# Patient Record
Sex: FEMALE | Race: BLACK OR AFRICAN AMERICAN | NOT HISPANIC OR LATINO | Employment: UNEMPLOYED | URBAN - METROPOLITAN AREA
[De-identification: names, ages, dates, MRNs, and addresses within clinical notes are randomized per-mention and may not be internally consistent; named-entity substitution may affect disease eponyms.]

---

## 2017-02-03 ENCOUNTER — OFFICE VISIT (OUTPATIENT)
Dept: FAMILY MEDICINE | Facility: CLINIC | Age: 37
End: 2017-02-03

## 2017-02-03 VITALS
HEART RATE: 85 BPM | RESPIRATION RATE: 14 BRPM | DIASTOLIC BLOOD PRESSURE: 79 MMHG | WEIGHT: 189.8 LBS | TEMPERATURE: 98 F | OXYGEN SATURATION: 99 % | SYSTOLIC BLOOD PRESSURE: 121 MMHG | BODY MASS INDEX: 29.27 KG/M2

## 2017-02-03 DIAGNOSIS — K59.00 CONSTIPATION, UNSPECIFIED CONSTIPATION TYPE: Primary | ICD-10-CM

## 2017-02-03 NOTE — PROGRESS NOTES
Preceptor Attestation:   Patient seen and discussed with the resident. Assessment and plan reviewed with resident and agreed upon.   Supervising Physician:  Emmanuel Murry MD  [facility]

## 2017-02-03 NOTE — MR AVS SNAPSHOT
After Visit Summary   2/3/2017    Terrance Mcwilliams    MRN: [id number]           Patient Information     Date Of Birth          1980        Visit Information        Provider Department      2/3/2017 9:00 AM Sean Eduardo, DO [facility]        Today's Diagnoses     Constipation, unspecified constipation type    -  1       Care Instructions    Here is the plan from today's visit    1. Constipation, unspecified constipation type  Drink at least 2 liters of water a day  Take psyllium every day  Come see me in 1 month  - Psyllium 48.57 % POWD; Take 1 tsp. by mouth daily  Dispense: 570 g; Refill: 1    Please call or return to clinic if your symptoms don't go away.    Follow up plan  Please make a clinic appointment for follow up with me (SEAN EDUARDO) in 1  month for constipation.    Thank you for coming to [facility] today.  Lab Testing:  **If you had lab testing today and your results are reassuring or normal they will be mailed to you or sent through [employer] within 7 days.   **If the lab tests need quick action we will call you with the results.  The phone number we will call with results is # [phone] (home) . If this is not the best number please call our clinic and change the number.  Medication Refills:  If you need any refills please call your pharmacy and they will contact us.   If you need to  your refill at a new pharmacy, please contact the new pharmacy directly. The new pharmacy will help you get your medications transferred faster.   Scheduling:  If you have any concerns about today's visit or wish to schedule another appointment please call our office during normal business hours [phone] (8-5:00 M-F)  If a referral was made to a [facility] and you don't get a call from central scheduling please call [phone].  If a Mammogram was ordered for you at [facility] call [phone] to schedule or change your  appointment.  If you had an XRay/CT/Ultrasound/MRI ordered the number is [phone] to schedule or change your radiology appointment.   Medical Concerns:  If you have urgent medical concerns please call [phone] at any time of the day.            Follow-ups after your visit        Who to contact     Please call your clinic at [phone] to:    Ask questions about your health    Make or cancel appointments    Discuss your medicines    Learn about your test results    Speak to your doctor   If you have compliments or concerns about an experience at your clinic, or if you wish to file a complaint, please contact [facility] Patient Relations at [phone] or email us at [email or web address]         Additional Information About Your Visit        [employer] Information     "[employer] is an electronic gateway that provides easy, online access to your medical records. With "[employer] you can request a clinic appointment, read your test results, renew a prescription or communicate with your care team.     To sign up for "[employer] visit the website at [email or web address]   You will be asked to enter the access code listed below, as well as some personal information. Please follow the directions to create your username and password.     Your access code is: [email or web address]  Expires: 2017  9:36 AM     Your access code will  in 90 days. If you need help or a new code, please contact your [facility] or call [phone] for assistance.        Care EveryWhere ID     This is your Care EveryWhere ID. This could be used by other organizations to access your [facility] medical records  [id number]        Your Vitals Were     Pulse Temperature Respirations Pulse Oximetry Last Period       85 98  F (36.7  C) (Oral) 14 99% 2017        Blood Pressure from Last 3 Encounters:   17 121/79   16 117/80   16 119/71    Weight from Last 3  Encounters:   02/03/17 189 lb 12.8 oz (86.093 kg)   06/21/16 186 lb 9.6 oz (84.641 kg)   05/04/16 189 lb (85.73 kg)              Today, you had the following     No orders found for display         Today's Medication Changes          These changes are accurate as of: 2/3/17  9:36 AM.  If you have any questions, ask your nurse or doctor.               Start taking these medicines.        Dose/Directions    hydrocortisone 2.5 % cream   Commonly known as:  ANUSOL-HC   Used for:  Constipation, unspecified constipation type   Started by:  Sean Eduardo, DO        Place rectally 2 times daily   Quantity:  30 g   Refills:  1       Psyllium 48.57 % Powd   Used for:  Constipation, unspecified constipation type   Started by:  Sean Eduardo DO        Dose:  1 tsp.   Take 1 tsp. by mouth daily   Quantity:  570 g   Refills:  1            Where to get your medicines      These medications were sent to [place] [address] [place] [address] [address]     Phone:  [phone]    - hydrocortisone 2.5 % cream  - Psyllium 48.57 % Powd             Primary Care Provider Office Phone # Fax #    Zoe Edouard [phone]       [facility] [address] [address]        Thank you!     Thank you for choosing [facility]  for your care. Our goal is always to provide you with excellent care. Hearing back from our patients is one way we can continue to improve our services. Please take a few minutes to complete the written survey that you may receive in the mail after your visit with us. Thank you!             Your Updated Medication List - Protect others around you: Learn how to safely use, store and throw away your medicines at www.disposemymeds.org.          This list is accurate as of: 2/3/17  9:36 AM.  Always use your most recent med list.                   Brand Name Dispense Instructions for use    hydrocortisone 2.5 % cream     ANUSOL-HC    30 g    Place rectally 2 times daily       polyethylene glycol powder    MIRALAX    119 g    Take 17 g (1 capful) by mouth daily       Prenatal Vitamin 27-0.8 MG Tabs     100 tablet    Take 1 tablet by mouth daily       Psyllium 48.57 % Powd     570 g    Take 1 tsp. by mouth daily       TYLENOL PO          vitamin D 2000 UNITS tablet     100 tablet    Take 2,000 Units by mouth daily

## 2017-02-03 NOTE — PROGRESS NOTES
HPI:       Terrance Mcwilliams is a 37 year old who presents for the following  Patient presents with:  Abdominal Pain: 1.5 years now on left side comes and goes will move to right side sometime   Imm/Inj: declined flu shot     Pain on the left side that moves to the right. She wonders if it is constipation, but is unsure.  Comes and goes for a while.  It hcan be very painful at times.  Feels relief sometimes with stooling other times not.  Pain around the recutum, worse after having a child.      Has had constipation her whole life.  Has tried several medications that dont help very much.  She has had xrays that show extra stool.      Problem, Medication and Allergy Lists were reviewed and are current.  Patient is an established patient of this clinic.         Review of Systems:   Review of Systems   Constitutional: Negative for fever and activity change.   Eyes: Negative for visual disturbance.   Respiratory: Negative for shortness of breath.    Cardiovascular: Negative for chest pain.   Gastrointestinal: Positive for abdominal pain and constipation. Negative for nausea, vomiting and diarrhea.   Skin: Negative for rash.   Neurological: Negative for dizziness, light-headedness and headaches.   Psychiatric/Behavioral: Negative for dysphoric mood.             Physical Exam:   Patient Vitals for the past 24 hrs:   BP Temp Temp src Pulse Resp SpO2 Weight   02/03/17 0912 121/79 mmHg 98  F (36.7  C) Oral 85 14 99 % 189 lb 12.8 oz (86.093 kg)     Body mass index is 29.27 kg/(m^2).  Vitals were reviewed and were normal     Physical Exam   Constitutional: She is oriented to person, place, and time. She appears well-developed and well-nourished.   HENT:   Head: Normocephalic and atraumatic.   Eyes: Conjunctivae and EOM are normal.   Cardiovascular: Normal rate, regular rhythm and normal heart sounds.  Exam reveals no gallop and no friction rub.    No murmur heard.  Pulmonary/Chest: Effort normal and breath sounds normal. No  respiratory distress. She has no wheezes. She has no rales. She exhibits no tenderness.   Abdominal: Soft. She exhibits no distension. There is tenderness.   Neurological: She is alert and oriented to person, place, and time. Abnormal reflex: mild tenderness with some fullness at splenic flexure  No cranial nerve deficit.   Skin: Skin is warm and dry.   Psychiatric: She has a normal mood and affect. Her behavior is normal. Judgment and thought content normal.         Results:     No investigations ordered today    Assessment and Plan     1. Constipation, unspecified constipation type  Encouraged a lot of water and a sustained bowel regimen.  Will start with psyllium.  May need to increase intensity.   - Psyllium 48.57 % POWD; Take 1 tsp. by mouth daily  Dispense: 570 g; Refill: 1      2. Hemorrhoids   - hydrocortisone (ANUSOL-HC) 2.5 % cream; Place rectally 2 times daily  Dispense: 30 g; Refill: 1    There are no discontinued medications.  Options for treatment and follow-up care were reviewed with the patient. Terrance Mcwilliams  engaged in the decision making process and verbalized understanding of the options discussed and agreed with the final plan.    Sean Eduardo,

## 2017-02-03 NOTE — PATIENT INSTRUCTIONS
Here is the plan from today's visit    1. Constipation, unspecified constipation type  Drink at least 2 liters of water a day  Take psyllium every day  Come see me in 1 month  - Psyllium 48.57 % POWD; Take 1 tsp. by mouth daily  Dispense: 570 g; Refill: 1    Please call or return to clinic if your symptoms don't go away.    Follow up plan  Please make a clinic appointment for follow up with me (DIANE NICOLAS) in 1  month for constipation.    Thank you for coming to [facility] today.  Lab Testing:  **If you had lab testing today and your results are reassuring or normal they will be mailed to you or sent through [email or web address] within 7 days.   **If the lab tests need quick action we will call you with the results.  The phone number we will call with results is # [phone] (home) . If this is not the best number please call our clinic and change the number.  Medication Refills:  If you need any refills please call your pharmacy and they will contact us.   If you need to  your refill at a new pharmacy, please contact the new pharmacy directly. The new pharmacy will help you get your medications transferred faster.   Scheduling:  If you have any concerns about today's visit or wish to schedule another appointment please call our office during normal business hours [phone] (8-5:00 M-F)  If a referral was made to a [facility] and you don't get a call from central scheduling please call [phone].  If a Mammogram was ordered for you at [facility] call [phone] to schedule or change your appointment.  If you had an XRay/CT/Ultrasound/MRI ordered the number is [phone] to schedule or change your radiology appointment.   Medical Concerns:  If you have urgent medical concerns please call [phone] at any time of the day.

## 2017-02-07 ASSESSMENT — ENCOUNTER SYMPTOMS
ACTIVITY CHANGE: 0
DIZZINESS: 0
SHORTNESS OF BREATH: 0
DIARRHEA: 0
LIGHT-HEADEDNESS: 0
FEVER: 0
VOMITING: 0
ABDOMINAL PAIN: 1
NAUSEA: 0
HEADACHES: 0
CONSTIPATION: 1
DYSPHORIC MOOD: 0

## 2017-03-31 ENCOUNTER — OFFICE VISIT (OUTPATIENT)
Dept: FAMILY MEDICINE | Facility: CLINIC | Age: 37
End: 2017-03-31

## 2017-03-31 VITALS
HEIGHT: 67 IN | TEMPERATURE: 97.8 F | DIASTOLIC BLOOD PRESSURE: 85 MMHG | OXYGEN SATURATION: 99 % | RESPIRATION RATE: 18 BRPM | BODY MASS INDEX: 28.88 KG/M2 | WEIGHT: 184 LBS | HEART RATE: 74 BPM | SYSTOLIC BLOOD PRESSURE: 128 MMHG

## 2017-03-31 DIAGNOSIS — R53.83 OTHER FATIGUE: ICD-10-CM

## 2017-03-31 DIAGNOSIS — R07.9 CHEST PAIN, UNSPECIFIED TYPE: Primary | ICD-10-CM

## 2017-03-31 LAB
CHOLEST SERPL-MCNC: 147.4 MG/DL (ref 0–200)
CHOLEST/HDLC SERPL: 3.1 {RATIO} (ref 0–5)
HBA1C MFR BLD: 5.5 % (ref 4.1–5.7)
HDLC SERPL-MCNC: 47.7 MG/DL
HGB BLD-MCNC: 12.7 G/DL (ref 11.7–15.7)
LDLC SERPL CALC-MCNC: 92 MG/DL (ref 0–129)
TRIGL SERPL-MCNC: 40.4 MG/DL (ref 0–150)
TSH SERPL DL<=0.005 MIU/L-ACNC: 1.15 MU/L (ref 0.4–4)
VLDL CHOLESTEROL: 8.1 MG/DL (ref 7–32)

## 2017-03-31 ASSESSMENT — ENCOUNTER SYMPTOMS
ACTIVITY CHANGE: 1
HEADACHES: 1
FEVER: 0
VOMITING: 0
FATIGUE: 1
ABDOMINAL PAIN: 0
COUGH: 0
CHILLS: 0
DYSURIA: 0
NAUSEA: 1
SHORTNESS OF BREATH: 1
MYALGIAS: 1
ARTHRALGIAS: 1

## 2017-03-31 NOTE — PROGRESS NOTES
Preceptor Attestation:   Patient seen and discussed with the resident. I personally viewed the EKG and agree with the interpretation documented by the resident.   Assessment and plan reviewed with resident and agreed upon.   Supervising Physician:  Fabian Bruno MD  [facility]

## 2017-03-31 NOTE — PROGRESS NOTES
"      HPI:       Terrance Mcwilliams is a 37 year old who presents for the following  Patient presents with:  Pain: left rib pain, pressure in the area, 5/10 pain scale,3 months        Concern:  Pain under breast, radiates to shoulder and back     Patient is a 38 yo F with a past medical history of recurrent miscarriages who presents with chest pain under the left breast.      Patient states she has pain under the left breast which sometimes radiates to shoulder and back.  It has been going on for 6 months.  Pain is worse when she doesn't get enough sleep.  Migraines make pain worse.  Heavy lifting makes pain worse.  Pain wakes patient up if she sleeps on left side.  Pain is worse with taking a deep breath and worse with activity such as climbing stairs (although she admits to having shortness of breath while climbing stairs \"for a while\").  She also has nausea on occasion when she has the chest pain.  Pain is not related to eating.    Patient has had a mammogram in the past (3/10/14) for breast pain (at that time patient was having  pain in upper part of breast).  Patient states the pain she has now is \"inside the body\", not inside the breast or inside the muscle.  Episodes can last whole day.  She is concerned because both her mother and father had problems with their hearts (she doesn't know what happened or how old they were).          A [facility]  was used for  this visit.      Problem, Medication and Allergy Lists were reviewed and are current.  Patient is an established patient of this clinic.         Review of Systems:   Review of Systems   Constitutional: Positive for activity change and fatigue. Negative for chills and fever.   Respiratory: Positive for shortness of breath (worse over last 6 months). Negative for cough.    Cardiovascular: Positive for chest pain (difficult to know if it is cardiac or musculoskeltal in origin).   Gastrointestinal: Positive for nausea (has nausea when she has left sided " "chest pain and shortness of breath). Negative for abdominal pain and vomiting.   Genitourinary: Negative for dysuria.   Musculoskeletal: Positive for arthralgias and myalgias.   Neurological: Positive for headaches (once every 2 weeks or once a month).             Physical Exam:   Patient Vitals for the past 24 hrs:   BP Temp Temp src Pulse Resp SpO2 Height Weight   03/31/17 0954 128/85 97.8  F (36.6  C) Oral 74 18 99 % 5' 7\" (170.2 cm) 184 lb (83.5 kg)     Body mass index is 28.82 kg/(m^2).  Vitals were reviewed and were normal     Physical Exam   Constitutional: She is oriented to person, place, and time. She appears well-developed and well-nourished.   HENT:   Head: Normocephalic.   Neck: Normal range of motion. Neck supple.   Cardiovascular: Normal rate and regular rhythm.    No murmur heard.  Pulmonary/Chest: Effort normal and breath sounds normal. No respiratory distress. She has no wheezes. She has no rales. She exhibits tenderness (mild tenderness to deep palpation of chest wall under left breast).   Left breast exam performed.  Skin and nipple normal, no lumps or masses.  No pain elicited on exam of breast.  Mild tenderness to palpation of chest wall below left breast.   Musculoskeletal: Normal range of motion.   Neurological: She is alert and oriented to person, place, and time.   Skin: No rash noted. No erythema. No pallor.   Psychiatric: She has a normal mood and affect. Her behavior is normal. Judgment and thought content normal.         Results:      Results from the last 24 hours  Results for orders placed or performed in visit on 03/31/17 (from the past 24 hour(s))   Lipid Cascade ([facility])   Result Value Ref Range    Cholesterol 147.4 0.0 - 200.0 mg/dL    Cholesterol/HDL Ratio 3.1 0.0 - 5.0    HDL Cholesterol 47.7 >40.0 mg/dL    LDL Cholesterol Calculated 92 0 - 129 mg/dL    Triglycerides 40.4 0.0 - 150.0 mg/dL    VLDL Cholesterol 8.1 7.0 - 32.0 mg/dL   Hemoglobin A1c ([facility])   Result Value Ref " Range    Hemoglobin A1C 5.5 4.1 - 5.7 %   Hemoglobin   Result Value Ref Range    Hemoglobin 12.7 11.7 - 15.7 g/dL   TSH with free T4 reflex   Result Value Ref Range    TSH 1.15 0.40 - 4.00 mU/L     EKG:  Rate:  72, NSR, Nl axis, No ST/T wave elevation/depression (with exception of flipped QRS and T wave in V1)    Assessment and Plan     Terrance was seen today for pain.     Ms. Mcwilliams is complaining of chest pain.  I think it is most likely musculoskeletal pain that is causing her symptoms, but with her description of the pain and family history, will order an exercise stress echo to make sure cardiac disease is not causing her symptoms.  Her EKG was normal.  We also ordered lipids and an A1c.  These results further decrease my suspicion for cardiac etiology, but will continue with workup.      Differential also includes etiologies such as breast pain (although unlikely since exam was normal), GERD/PUD, pleuritis, costochondritis.  Will plan for patient to follow up after the stress echo to discuss whether or not she needs further workup.      Diagnoses and all orders for this visit:    Chest pain, unspecified type  -     Exercise Stress Echocardiogram; Future  -     Lipid Cascade ([facility])  -     Hemoglobin A1c ([facility])    Other fatigue  -     Hemoglobin  -     TSH with free T4 reflex  -     Vitamin D Level      There are no discontinued medications.  Options for treatment and follow-up care were reviewed with the patient. Terrance Mcwilliams  engaged in the decision making process and verbalized understanding of the options discussed and agreed with the final plan.    Jossie Eduardo M.D.  PGY-2, Family Medicine    Addendum 4/16:  EKG was ordered in clinic.  Order placed today.

## 2017-03-31 NOTE — LETTER
April 8, 2017      Terrance Mcwilliams  [address]   [place] [address]        Dear Terrance,    Thank you for getting your care at [facility]. Please see below for your test results.    Resulted Orders   Lipid Cascade ([facility])   Result Value Ref Range    Cholesterol 147.4 0.0 - 200.0 mg/dL    Cholesterol/HDL Ratio 3.1 0.0 - 5.0    HDL Cholesterol 47.7 >40.0 mg/dL    LDL Cholesterol Calculated 92 0 - 129 mg/dL    Triglycerides 40.4 0.0 - 150.0 mg/dL    VLDL Cholesterol 8.1 7.0 - 32.0 mg/dL   Hemoglobin A1c ([facility])   Result Value Ref Range    Hemoglobin A1C 5.5 4.1 - 5.7 %   Hemoglobin   Result Value Ref Range    Hemoglobin 12.7 11.7 - 15.7 g/dL   TSH with free T4 reflex   Result Value Ref Range    TSH 1.15 0.40 - 4.00 mU/L   Vitamin D Level   Result Value Ref Range    Vitamin D Deficiency screening 23 20 - 75 ug/L      Comment:      Season, race, dietary intake, and treatment affect the concentration of   25-hydroxy-Vitamin D. Values may decrease during winter months and increase   during summer months. Values 20-29 ug/L may indicate Vitamin D insufficiency   and values <20 ug/L may indicate Vitamin D deficiency.   Vitamin D determination is routinely performed by an immunoassay specific for   25 hydroxyvitamin D3.  If an individual is on vitamin D2 (ergocalciferol)   supplementation, please specify 25 OH vitamin D2 and D3 level determination   by   LCMSMS test VITD23.       All of your labs were normal.  Your vitamin D level was a little low, but your cholesterol was excellent.      If you have any concerns about these results please call and leave a message for me or send a [employer] message to the clinic.    Sincerely,    Jossie Eduardo MD

## 2017-03-31 NOTE — MR AVS SNAPSHOT
After Visit Summary   3/31/2017    Terrance Mcwilliams    MRN: [id number]           Patient Information     Date Of Birth          1980        Visit Information        Provider Department      3/31/2017 9:40 AM Jeffery Eduardo MD [facility]        Today's Diagnoses     Chest pain, unspecified type    -  1    Other fatigue          Care Instructions    Here is the plan from today's visit    1. Chest pain, unspecified type  We have ordered a special test to check your heart function.  We are also checking labs today.  I still think your pain is most likely from a muscle strain, but I want to be sure your heart is ok.  - Exercise Stress Echocardiogram; Future  - Lipid Cascade ([facility])  - Hemoglobin A1c ([facility])      Please call or return to clinic if your symptoms don't go away.    Follow up plan  Please make a clinic appointment for follow up with me (JEFFERY EDUARDO) after your cardiac stress test.  Thank you for coming to [facility] today.  Lab Testing:  **If you had lab testing today and your results are reassuring or normal they will be mailed to you or sent through [employer] within 7 days.   **If the lab tests need quick action we will call you with the results.  The phone number we will call with results is # [phone] (home) . If this is not the best number please call our clinic and change the number.  Medication Refills:  If you need any refills please call your pharmacy and they will contact us.   If you need to  your refill at a new pharmacy, please contact the new pharmacy directly. The new pharmacy will help you get your medications transferred faster.   Scheduling:  If you have any concerns about today's visit or wish to schedule another appointment please call our office during normal business hours [phone] (8-5:00 M-F)  If a referral was made to a [facility] and you don't get a call from central scheduling  please call [phone].  If a Mammogram was ordered for you at [facility] call [phone] to schedule or change your appointment.  If you had an XRay/CT/Ultrasound/MRI ordered the number is [phone] to schedule or change your radiology appointment.   Medical Concerns:  If you have urgent medical concerns please call [phone] at any time of the day.          Follow-ups after your visit        Your next 10 appointments already scheduled     Apr 07, 2017 10:00 AM CDT   Ech Stress Test with  STRESS ROOM   [facility] ([facility])    [address] [place] [address]  3rd Floor  [address]              1.  Please bring or wear a comfortable two-piece outfit and walking shoes. 2.  Stop eating 3 hours before the test. You may drink water or juice. 3.  Stop all caffeine 12 hours before the test. This includes coffee, tea, soda pop, chocolate and certain medicines (such as Anacin and Excederin). Also avoid decaf coffee and tea, as these contain small amounts of caffeine. 4.  No alcohol, smoking or use of other tobacco products for 12 hours before the test. 5.  Refer to your provider instructions to see if you need to stop any medications (such as beta-blockers or nitrates) for this test. 6.  For patients with diabetes: -   If you take insulin, call your diabetes care team. Ask if you should take a   dose the morning of your test. -   If you take diabetes medicine by mouth, don't take it on the morning of your test. Bring it with you to take after the test.  (If you have questions, call your diabetes care team) 7.  When you arrive, please tell us if: -   You have diabetes. -   You have taken Viagra, Cialis or Levitra in the past 48 hours. 8.  For any questions that cannot be answered, please contact the ordering physician              Future tests that were ordered for you today     Open Future Orders        Priority Expected Expires Ordered    Exercise Stress Echocardiogram Routine   "3/31/2018 3/31/2017            Who to contact     Please call your clinic at [phone] to:    Ask questions about your health    Make or cancel appointments    Discuss your medicines    Learn about your test results    Speak to your doctor   If you have compliments or concerns about an experience at your clinic, or if you wish to file a complaint, please contact [facility] Patient Relations at [phone] or email us at [email or web address]         Additional Information About Your Visit        Deed Information     Deed is an electronic gateway that provides easy, online access to your medical records. With Deed, you can request a clinic appointment, read your test results, renew a prescription or communicate with your care team.     To sign up for Deed visit the website at [email or web address]   You will be asked to enter the access code listed below, as well as some personal information. Please follow the directions to create your username and password.     Your access code is: [email or web address]  Expires: 2017 10:36 AM     Your access code will  in 90 days. If you need help or a new code, please contact your [facility] or call [phone] for assistance.        Care EveryWhere ID     This is your Care EveryWhere ID. This could be used by other organizations to access your [place] medical records  [id number]        Your Vitals Were     Pulse Temperature Respirations Height Pulse Oximetry BMI (Body Mass Index)    74 97.8  F (36.6  C) (Oral) 18 5' 7\" (170.2 cm) 99% 28.82 kg/m2       Blood Pressure from Last 3 Encounters:   17 128/85   17 121/79   16 117/80    Weight from Last 3 Encounters:   17 184 lb (83.5 kg)   17 189 lb 12.8 oz (86.1 kg)   16 186 lb 9.6 oz (84.6 kg)              We Performed the Following     Hemoglobin A1c ([facility])     Hemoglobin     Lipid Cascade ([facility])  "    TSH with free T4 reflex     Vitamin D Level        Primary Care Provider Office Phone # Fax #    Zoe Edouard [phone]       [facility] [address] [facility] [address]        Thank you!     Thank you for choosing [facility]  for your care. Our goal is always to provide you with excellent care. Hearing back from our patients is one way we can continue to improve our services. Please take a few minutes to complete the written survey that you may receive in the mail after your visit with us. Thank you!             Your Updated Medication List - Protect others around you: Learn how to safely use, store and throw away your medicines at www.disposemymeds.org.          This list is accurate as of: 3/31/17 10:57 AM.  Always use your most recent med list.                   Brand Name Dispense Instructions for use    hydrocortisone 2.5 % cream    ANUSOL-HC    30 g    Place rectally 2 times daily       polyethylene glycol powder    MIRALAX    119 g    Take 17 g (1 capful) by mouth daily       Prenatal Vitamin 27-0.8 MG Tabs     100 tablet    Take 1 tablet by mouth daily       Psyllium 48.57 % Powd     570 g    Take 1 tsp. by mouth daily       TYLENOL PO          vitamin D 2000 UNITS tablet     100 tablet    Take 2,000 Units by mouth daily

## 2017-03-31 NOTE — PATIENT INSTRUCTIONS
Here is the plan from today's visit    1. Chest pain, unspecified type  We have ordered a special test to check your heart function.  We are also checking labs today.  I still think your pain is most likely from a muscle strain, but I want to be sure your heart is ok.  - Exercise Stress Echocardiogram; Future  - Lipid Cascade ([facility])  - Hemoglobin A1c ([facility])      Please call or return to clinic if your symptoms don't go away.    Follow up plan  Please make a clinic appointment for follow up with me (JEFFERY NICOLAS) after your cardiac stress test.  Thank you for coming to [facility] today.  Lab Testing:  **If you had lab testing today and your results are reassuring or normal they will be mailed to you or sent through [employer] within 7 days.   **If the lab tests need quick action we will call you with the results.  The phone number we will call with results is # [phone] (home) . If this is not the best number please call our clinic and change the number.  Medication Refills:  If you need any refills please call your pharmacy and they will contact us.   If you need to  your refill at a new pharmacy, please contact the new pharmacy directly. The new pharmacy will help you get your medications transferred faster.   Scheduling:  If you have any concerns about today's visit or wish to schedule another appointment please call our office during normal business hours [phone] (8-5:00 M-F)  If a referral was made to a [facility] and you don't get a call from central scheduling please call [phone].  If a Mammogram was ordered for you at [facility] call [phone] to schedule or change your appointment.  If you had an XRay/CT/Ultrasound/MRI ordered the number is [phone] to schedule or change your radiology appointment.   Medical Concerns:  If you have urgent medical concerns please call [phone] at any time of the day.

## 2017-04-03 LAB — DEPRECATED CALCIDIOL+CALCIFEROL SERPL-MC: 23 UG/L (ref 20–75)

## 2017-09-10 ENCOUNTER — HEALTH MAINTENANCE LETTER (OUTPATIENT)
Age: 37
End: 2017-09-10

## 2018-02-15 ENCOUNTER — OFFICE VISIT (OUTPATIENT)
Dept: FAMILY MEDICINE | Facility: CLINIC | Age: 38
End: 2018-02-15
Payer: OTHER MISCELLANEOUS

## 2018-02-15 VITALS
OXYGEN SATURATION: 99 % | HEART RATE: 82 BPM | DIASTOLIC BLOOD PRESSURE: 85 MMHG | SYSTOLIC BLOOD PRESSURE: 127 MMHG | WEIGHT: 195.4 LBS | BODY MASS INDEX: 30.6 KG/M2 | TEMPERATURE: 97.8 F

## 2018-02-15 DIAGNOSIS — S43.422D SPRAIN OF LEFT ROTATOR CUFF CAPSULE, SUBSEQUENT ENCOUNTER: Primary | ICD-10-CM

## 2018-02-15 ASSESSMENT — ENCOUNTER SYMPTOMS
BACK PAIN: 0
NECK PAIN: 0
MYALGIAS: 1
APPETITE CHANGE: 0
NECK STIFFNESS: 0
JOINT SWELLING: 0
WOUND: 0
ACTIVITY CHANGE: 1

## 2018-02-15 NOTE — MR AVS SNAPSHOT
After Visit Summary   2/15/2018    Terrance Mcwilliams    MRN: [id number]           Patient Information     Date Of Birth          1980        Visit Information        Provider Department      2/15/2018 10:20 AM Zoe Edouard MD [facility]        Today's Diagnoses     Sprain of left rotator cuff capsule, subsequent encounter    -  1      Care Instructions    1. Ice and ibuprofen 3x daily with meal  2. Physical therapy 4-6 weeks, referral   3. Perform muscle exercise 20 times/day            Follow-ups after your visit        Additional Services     [facility], PT, HAND AND CHIROPRACTIC REFERRAL - [facility]       **This order will print in the [facility] Scheduling Office**    Physical Therapy, Hand Therapy and Chiropractic Care are available through:    *[facility]  *[facility]  *[facility]    Call one number to schedule at any of the above locations: [phone].    Your provider has referred you to: Physical Therapy at [facility] or [facility]    Indication/Reason for Referral: Shoulder Pain  Onset of Illness: Jan 2018 after a fall  Therapy Orders: Evaluate and Treat  Special Programs: None  Special Request: as indicated by therapist      Karthik Angeles      Additional Comments for the Therapist or Chiropractor: Pt works in [employer]    Please be aware that coverage of these services is subject to the terms and limitations of your health insurance plan.  Call member services at your health plan with any benefit or coverage questions.      Please bring the following to your appointment:    *Your personal calendar for scheduling future appointments  *Comfortable clothing                  Who to contact     Please call your clinic at [phone] to:    Ask questions about your health    Make or cancel appointments    Discuss your medicines    Learn about your test results    Speak to your doctor            Additional Information About Your Visit        Maddy  Information     eMinor is an electronic gateway that provides easy, online access to your medical records. With eMinor, you can request a clinic appointment, read your test results, renew a prescription or communicate with your care team.     To sign up for eMinor visit the website at [email or web address] [email or web address]   You will be asked to enter the access code listed below, as well as some personal information. Please follow the directions to create your username and password.     Your access code is: [email or web address]  Expires: 2018 11:31 AM     Your access code will  in 90 days. If you need help or a new code, please contact your [facility] or call [phone] for assistance.        Care EveryWhere ID     This is your Care EveryWhere ID. This could be used by other organizations to access your [place] medical records  [id number]        Your Vitals Were     Pulse Temperature Last Period Pulse Oximetry Breastfeeding? BMI (Body Mass Index)    82 97.8  F (36.6  C) (Oral) 02/10/2018 99% No 30.6 kg/m2       Blood Pressure from Last 3 Encounters:   02/15/18 127/85   17 128/85   17 121/79    Weight from Last 3 Encounters:   02/15/18 195 lb 6.4 oz (88.6 kg)   17 184 lb (83.5 kg)   17 189 lb 12.8 oz (86.1 kg)              We Performed the Following     [facility], PT, HAND AND CHIROPRACTIC REFERRAL - [facility]        Primary Care Provider Office Phone # Fax #    Zoe Edouard [phone]        [address] [address]        Equal Access to Services     SUE GODOY : Hadmehrdad Ann, sha aguilar, shelly sterling. So [facility] [phone].    ATENCIÓN: Si habla español, tiene a dalal disposición servicios gratuitos de asistencia lingüística. Toy al [phone].    We comply with applicable federal civil rights laws and [place] laws. We do not discriminate on the basis of  race, color, national origin, age, disability, sex, sexual orientation, or gender identity.            Thank you!     Thank you for choosing [facility]  for your care. Our goal is always to provide you with excellent care. Hearing back from our patients is one way we can continue to improve our services. Please take a few minutes to complete the written survey that you may receive in the mail after your visit with us. Thank you!             Your Updated Medication List - Protect others around you: Learn how to safely use, store and throw away your medicines at www.disposemymeds.org.          This list is accurate as of 2/15/18 11:31 AM.  Always use your most recent med list.                   Brand Name Dispense Instructions for use Diagnosis    hydrocortisone 2.5 % cream    ANUSOL-HC    30 g    Place rectally 2 times daily    Constipation, unspecified constipation type       polyethylene glycol powder    MIRALAX    119 g    Take 17 g (1 capful) by mouth daily    Constipation, unspecified constipation type       Prenatal Vitamin 27-0.8 MG Tabs     100 tablet    Take 1 tablet by mouth daily    Healthcare maintenance       Psyllium 48.57 % Powd     570 g    Take 1 tsp. by mouth daily    Constipation, unspecified constipation type       TYLENOL PO           vitamin D 2000 UNITS tablet     100 tablet    Take 2,000 Units by mouth daily    Healthcare maintenance

## 2018-02-15 NOTE — PROGRESS NOTES
HPI:       Terrance Mcwilliams is a 38 year old who presents for the following  Patient presents with:  RECHECK: WC pt fell on L shoulder. Pt went to urgent care 01/23/18 at [facility].      Workplace injury   Worker's Compensation Covered Injury    When?: Jan 22, 2018    What Happened?  Fell in front of store on the ice. Unknown mechanism she does not recall. Went to [facility].  She had x-rays there which showed no acute fracture.    Where ? Left rib cage, up to axilla, and from the shoulder down to her down to hand - swollen.  The swelling has greatly improved if not completely resolved.  Work Related? Yes    Place of employment [employer],     Able to Work? Yes but with restrictions which are self-imposed due to pain.  She would like a note for work.  Job requirements:lifting/carrying    Description of pain /Injury:     Location:  Left shoulder pain and upper arm. Difficulty washing herself. Numbness and tingling resolved.  Using ibuprofen and ice which both do help. Cannot sleep on left side has completely resolved. Bruising on left arm.     Muscle weakness? Yes    Pain is stable    Palliative Factors: ice and OTC NSAIDs        Problem, Medication and Allergy Lists were reviewed and are current.  Patient is an established patient of this clinic.         Review of Systems:   Review of Systems   Constitutional: Positive for activity change. Negative for appetite change.   Musculoskeletal: Positive for myalgias. Negative for back pain, joint swelling, neck pain and neck stiffness.   Skin: Negative for rash and wound.        Left shoulder pain  No numbness or tingling  No weakness of the hand         Physical Exam:     Patient Vitals for the past 24 hrs:   BP Temp Temp src Pulse SpO2 Weight   02/15/18 1042 127/85 97.8  F (36.6  C) Oral 82 99 % 195 lb 6.4 oz (88.6 kg)     Body mass index is 30.6 kg/(m^2).  Vitals were reviewed and were normal     Physical Exam   Musculoskeletal:        Right shoulder: She  exhibits decreased range of motion. She exhibits no tenderness.   Lt elevation 90 degrees  Lt internal rotation to low back, lower than the right side.   External rotation limited by 10 degrees.   Pain with supraspinatus.   5/5 subscapularis without pain.  External rotation 5 minus, suspect due to pain     Scapular spine, acromion, clavicle, corticoid non-tender.     Tender to palpation:  Proximal head of biceps tendon      Some trap spasm     NEER positive and Mahmood positive     Cervical spine non-tender to palpation     Limited ear to shoulder on rt, otherwise neck ROM complete         ly normal         Results:      Results from the last 24 hoursNo results found for this or any previous visit (from the past 24 hour(s)).  Assessment and Plan       Terrance was seen today for recheck.    Diagnoses and all orders for this visit:    Sprain of left rotator cuff capsule, subsequent encounter  -     [facility], PT, HAND AND CHIROPRACTIC REFERRAL - [facility]    Suspect supraspinatus involvement as well as Terbebeto minor   Advised that it would likely take 4-6 weeks in physical therapy for this to get better.  She will have to go to physical therapy for initial evaluation followed by subsequent visits.  She will have home therapies to do.  She is okay with this.  She will continue ibuprofen 3 times daily preferably with meals.  She will ice twice a day at least.  Taught how to do basic shoulder circles to maintain motion.  Explained that preventing frozen shoulder is a big part of rehabbing her shoulder completely.  Provided letter for limitations at work, including lifting and carrying.  Her push pull is unaffected because she can use her unaffected hand.  She should return approximately 2 weeks after starting PT for an updated letter if her work needs her to get back to more activities.  AVS  1. Ice and ibuprofen 3x daily with meal  2. Physical therapy 4-6 weeks, referral   3. Perform muscle exercise 20 times/day    There are no  discontinued medications.  Options for treatment and follow-up care were reviewed with the patient. Terrance Mcwilliams  engaged in the decision making process and verbalized understanding of the options discussed and agreed with the final plan.    The information in this document, created by the medical scribe for me, accurately reflects the services I personally performed and the decisions made by me. I have reviewed and approved this document for accuracy prior to leaving the patient care area.  Zoe Edouard MD  10:44 AM, 02/15/18  I spent 27 min face to face with the patient and >50% was spent counselling the patient about the above medical conditions, educating patient on their medical conditions, behavioral interventions and supports.      Zoe Edouard MD

## 2018-02-15 NOTE — PATIENT INSTRUCTIONS
1. Ice and ibuprofen 3x daily with meal  2. Physical therapy 4-6 weeks, referral   3. Perform muscle exercise 20 times/day

## 2018-02-15 NOTE — LETTER
February 15, 2018      Terrance Mcwilliams  [address]   [place] [address]        Dear Terrance,      RETURN TO WORK/SCHOOL FORM    2/15/2018    Re: Terrance Mcwilliams  1980      To Whom It May Concern:     Terrance Mcwilliams was seen in clinic today. She may return to work with restrictions on 2/18/18          Restrictions: limited to light duty - lifting no greater than 10 pounds, 3 times per day only. No overhead lifting or overhead work at all. She is to avoid twisting to the left or reaching to the left for activities. She can sit or stand. Walking is not limited. Push and pull with a cart is not limited.       Sincerely,                Zoe Edouard MD

## 2018-02-22 ENCOUNTER — THERAPY VISIT (OUTPATIENT)
Dept: PHYSICAL THERAPY | Facility: CLINIC | Age: 38
End: 2018-02-22
Payer: OTHER MISCELLANEOUS

## 2018-02-22 DIAGNOSIS — M25.512 ACUTE PAIN OF LEFT SHOULDER: Primary | ICD-10-CM

## 2018-02-22 PROCEDURE — 97161 PT EVAL LOW COMPLEX 20 MIN: CPT | Mod: GP | Performed by: PHYSICAL THERAPIST

## 2018-02-22 PROCEDURE — 97110 THERAPEUTIC EXERCISES: CPT | Mod: GP | Performed by: PHYSICAL THERAPIST

## 2018-02-22 NOTE — MR AVS SNAPSHOT
"              After Visit Summary   2/22/2018    Terrance Mcwilliams    MRN: [id number]           Patient Information     Date Of Birth          1980        Visit Information        Provider Department      2/22/2018 10:30 AM Tonya Pt, Pedro PT [facility]        Today's Diagnoses     Acute pain of left shoulder    -  1       Follow-ups after your visit        Your next 10 appointments already scheduled     Mar 01, 2018 12:10 PM CST   CAMPOS Extremity with Carolyn Uribe PT   [facility] ([facility])    [address] [place] [id number] [phone]              Who to contact     If you have questions or need follow up information about today's clinic visit or your schedule please contact [facility] directly at [phone].  Normal or non-critical lab and imaging results will be communicated to you by [employer] Dodohart, letter or phone within 4 business days after the clinic has received the results. If you do not hear from us within 7 days, please contact the clinic through The Dodohart or phone. If you have a critical or abnormal lab result, we will notify you by phone as soon as possible.  Submit refill requests through MedArkive or call your pharmacy and they will forward the refill request to us. Please allow 3 business days for your refill to be completed.          Additional Information About Your Visit        MyChart Information     MedArkive lets you send messages to your doctor, view your test results, renew your prescriptions, schedule appointments and more. To sign up, go to [email or web address] . Click on \"Log in\" on the left side of the screen, which will take you to the Welcome page. Then click on \"Sign up Now\" on the right side of the page.     You will be asked to enter the access code listed below, as well as some personal information. Please follow the directions to create your " username and password.     Your access code is: [email or web address]  Expires: 2018 11:31 AM     Your access code will  in 90 days. If you need help or a new code, please call your [facility] clinic or [phone].        Care EveryWhere ID     This is your Care EveryWhere ID. This could be used by other organizations to access your [facility] medical records  [id number]        Your Vitals Were     Last Period                   02/10/2018            Blood Pressure from Last 3 Encounters:   02/15/18 127/85   17 128/85   17 121/79    Weight from Last 3 Encounters:   02/15/18 88.6 kg (195 lb 6.4 oz)   17 83.5 kg (184 lb)   17 86.1 kg (189 lb 12.8 oz)              We Performed the Following     HC PT EVAL, LOW COMPLEXITY     CAMPOS INITIAL EVAL REPORT     THERAPEUTIC EXERCISES        Primary Care Provider Office Phone # Fax #    Zoe Edouard [phone]        [address] [address]        Equal Access to Services     [facility] : Hadii aad ku hadasho Soomaali, waaxda luqadaha, qaybta kaalmada ademarine, shelly wei . So [facility] [phone].    ATENCIÓN: Si habla español, tiene a dalal disposición servicios gratuitos de asistencia lingüística. Toy al [phone].    We comply with applicable federal civil rights laws and [place] laws. We do not discriminate on the basis of race, color, national origin, age, disability, sex, sexual orientation, or gender identity.            Thank you!     Thank you for choosing [facility]  for your care. Our goal is always to provide you with excellent care. Hearing back from our patients is one way we can continue to improve our services. Please take a few minutes to complete the written survey that you may receive in the mail after your visit with us. Thank you!             Your Updated Medication List - Protect others around you: Learn how to safely use, store and  throw away your medicines at www.disposemymeds.org.          This list is accurate as of 2/22/18 11:12 AM.  Always use your most recent med list.                   Brand Name Dispense Instructions for use Diagnosis    hydrocortisone 2.5 % cream    ANUSOL-HC    30 g    Place rectally 2 times daily    Constipation, unspecified constipation type       polyethylene glycol powder    MIRALAX    119 g    Take 17 g (1 capful) by mouth daily    Constipation, unspecified constipation type       Prenatal Vitamin 27-0.8 MG Tabs     100 tablet    Take 1 tablet by mouth daily    Healthcare maintenance       Psyllium 48.57 % Powd     570 g    Take 1 tsp. by mouth daily    Constipation, unspecified constipation type       TYLENOL PO           vitamin D 2000 UNITS tablet     100 tablet    Take 2,000 Units by mouth daily    Healthcare maintenance

## 2018-02-22 NOTE — PROGRESS NOTES
[facility] Initial Evaluation  Subjective:  Patient is a 38 year old female presenting with rehab left shoulder hpi. The history is provided by the patient. No  was used.   Terrance Mcwilliams is a 38 year old female with a left shoulder condition.  Condition occurred with:  A fall.  Condition occurred: at work.  This is a new condition  Slipped on ice at work and fell on L shoulder 1/22/2018. She says that at first she was unable to lift arm and it was very swollen. Now she says it is getting better, but still hard to lift and dress.    Patient reports pain:  Lateral.  Radiates to:  Upper arm.  Pain is described as aching and is intermittent and reported as 5/10.  Associated symptoms:  Loss of strength and loss of motion/stiffness. Pain is worse during the day.  Symptoms are exacerbated by using arm overhead, using arm behind back, using arm at shoulder level, lying on extremity, lifting and certain positions and relieved by rest and NSAID's.  Since onset symptoms are gradually improving.  Special tests:  X-ray (negative).      General health as reported by patient is excellent.  Pertinent medical history includes:  None.  Medical allergies: no.    Current medications:  Anti-inflammatory.    Patient is working in normal job with restrictions.  Primary job tasks include:  Prolonged standing, lifting and repetitive tasks.    Barriers include:  None as reported by the patient.    Red flags:  None as reported by the patient.                        Objective:  Standing Alignment:    Cervical/Thoracic:  Forward head  Shoulder/UE:  Scapular winging R, scapular winging L and rounded shoulders                                       Shoulder Evaluation:  ROM:  AROM:  : slow,but with cues she was able to demonstrate full AROM.                            PROM:  normal                                Strength:  : All normal except flex and abd 4/5 painful L.                      Stability  Testing:  normal      Special Tests:  Special tests assessed shoulder: + Neer's and HK.  Left shoulder positive for the following special tests:  Impingement  Left shoulder negative for the following special tests:  Rotator cuff tear    Palpation:    Left shoulder tenderness present at:  Deltoid    Mobility Tests:  normal                                                 General     ROS    Assessment/Plan:    Patient is a 38 year old female with left side shoulder complaints.    Patient has the following significant findings with corresponding treatment plan.                Diagnosis 1:  L shoulder Rc strain  Pain -  hot/cold therapy, self management, education and home program  Decreased ROM/flexibility - manual therapy and therapeutic exercise  Decreased strength - therapeutic exercise and therapeutic activities  Impaired muscle performance - neuro re-education  Decreased function - therapeutic activities    Therapy Evaluation Codes:   1) History comprised of:   Personal factors that impact the plan of care:      Language.    Comorbidity factors that impact the plan of care are:      None.     Medications impacting care: Anti-inflammatory.  2) Examination of Body Systems comprised of:   Body structures and functions that impact the plan of care:      Shoulder.   Activity limitations that impact the plan of care are:      Dressing and Working.  3) Clinical presentation characteristics are:   Stable/Uncomplicated.  4) Decision-Making    Low complexity using standardized patient assessment instrument and/or measureable assessment of functional outcome.  Cumulative Therapy Evaluation is: Low complexity.    Previous and current functional limitations:  (See Goal Flow Sheet for this information)    Short term and Long term goals: (See Goal Flow Sheet for this information)     Communication ability:  Patient appears to be able to clearly communicate and understand verbal and written communication and follow directions  correctly.  Treatment Explanation - The following has been discussed with the patient:   RX ordered/plan of care  Anticipated outcomes  Possible risks and side effects  This patient would benefit from PT intervention to resume normal activities.   Rehab potential is excellent.    Frequency:  1 X week, once daily  Duration:  for 4 weeks tapering to 2 X a month over 4 weeks  Discharge Plan:  Achieve all LTG.  Independent in home treatment program.  Reach maximal therapeutic benefit.    Please refer to the daily flowsheet for treatment today, total treatment time and time spent performing 1:1 timed codes.

## 2018-03-01 ENCOUNTER — THERAPY VISIT (OUTPATIENT)
Dept: PHYSICAL THERAPY | Facility: CLINIC | Age: 38
End: 2018-03-01
Payer: OTHER MISCELLANEOUS

## 2018-03-01 DIAGNOSIS — M25.512 ACUTE PAIN OF LEFT SHOULDER: ICD-10-CM

## 2018-03-01 PROCEDURE — 97112 NEUROMUSCULAR REEDUCATION: CPT | Mod: GP | Performed by: PHYSICAL THERAPIST

## 2018-03-01 PROCEDURE — 97110 THERAPEUTIC EXERCISES: CPT | Mod: GP | Performed by: PHYSICAL THERAPIST

## 2018-03-08 ENCOUNTER — THERAPY VISIT (OUTPATIENT)
Dept: PHYSICAL THERAPY | Facility: CLINIC | Age: 38
End: 2018-03-08
Payer: OTHER MISCELLANEOUS

## 2018-03-08 DIAGNOSIS — M25.512 ACUTE PAIN OF LEFT SHOULDER: ICD-10-CM

## 2018-03-08 PROCEDURE — 97110 THERAPEUTIC EXERCISES: CPT | Mod: GP | Performed by: PHYSICAL THERAPIST

## 2018-03-08 PROCEDURE — 97112 NEUROMUSCULAR REEDUCATION: CPT | Mod: GP | Performed by: PHYSICAL THERAPIST

## 2018-03-12 ENCOUNTER — THERAPY VISIT (OUTPATIENT)
Dept: PHYSICAL THERAPY | Facility: CLINIC | Age: 38
End: 2018-03-12
Payer: OTHER MISCELLANEOUS

## 2018-03-12 DIAGNOSIS — M25.512 ACUTE PAIN OF LEFT SHOULDER: ICD-10-CM

## 2018-03-12 PROCEDURE — 97110 THERAPEUTIC EXERCISES: CPT | Mod: GP | Performed by: PHYSICAL THERAPIST

## 2018-03-12 PROCEDURE — 97112 NEUROMUSCULAR REEDUCATION: CPT | Mod: GP | Performed by: PHYSICAL THERAPIST

## 2018-03-15 ENCOUNTER — THERAPY VISIT (OUTPATIENT)
Dept: PHYSICAL THERAPY | Facility: CLINIC | Age: 38
End: 2018-03-15
Payer: OTHER MISCELLANEOUS

## 2018-03-15 DIAGNOSIS — M25.512 ACUTE PAIN OF LEFT SHOULDER: ICD-10-CM

## 2018-03-15 PROCEDURE — 97110 THERAPEUTIC EXERCISES: CPT | Mod: GP | Performed by: PHYSICAL THERAPIST

## 2018-03-15 PROCEDURE — 97112 NEUROMUSCULAR REEDUCATION: CPT | Mod: GP | Performed by: PHYSICAL THERAPIST

## 2018-03-29 ENCOUNTER — OFFICE VISIT (OUTPATIENT)
Dept: FAMILY MEDICINE | Facility: CLINIC | Age: 38
End: 2018-03-29
Payer: OTHER MISCELLANEOUS

## 2018-03-29 ENCOUNTER — THERAPY VISIT (OUTPATIENT)
Dept: PHYSICAL THERAPY | Facility: CLINIC | Age: 38
End: 2018-03-29
Payer: OTHER MISCELLANEOUS

## 2018-03-29 VITALS
WEIGHT: 193.2 LBS | SYSTOLIC BLOOD PRESSURE: 138 MMHG | TEMPERATURE: 98 F | HEART RATE: 72 BPM | OXYGEN SATURATION: 99 % | BODY MASS INDEX: 30.26 KG/M2 | DIASTOLIC BLOOD PRESSURE: 91 MMHG

## 2018-03-29 DIAGNOSIS — M25.512 ACUTE PAIN OF LEFT SHOULDER: Primary | ICD-10-CM

## 2018-03-29 DIAGNOSIS — M25.512 ACUTE PAIN OF LEFT SHOULDER: ICD-10-CM

## 2018-03-29 PROCEDURE — 97112 NEUROMUSCULAR REEDUCATION: CPT | Mod: GP | Performed by: PHYSICAL THERAPIST

## 2018-03-29 PROCEDURE — 97110 THERAPEUTIC EXERCISES: CPT | Mod: GP | Performed by: PHYSICAL THERAPIST

## 2018-03-29 NOTE — PATIENT INSTRUCTIONS
1. If you need a new letter for work, call clinic.  2. Complete course of PT  3. If something aggravates your shoulder go back to ice, ibuprofen and gentle exercises.   4. For laying on left side, use pillows for support. Talk to PT about sleeping position.   5. Return as needed

## 2018-03-29 NOTE — PROGRESS NOTES
Subjective:  HPI                    Objective:  System    Physical Exam    General     ROS    Assessment/Plan:    PROGRESS  REPORT    Progress reporting period is from 2-22-18 to 3-29-18.     Ms. Mcwilliams continues to demonstrate signs of shoulder impingement.  Movement/progress also suggest some element of instability in shoulder.      Feels that she is improving but still having pain using arm outstretched in front or side especially.  Still sore often.  Still not sleeping on that side    Now is able to wash her hair and reach her other shoulder.  Doing things overhead (folding clothing, putting boxes overhead or repeated reaching at work) still increases pain, makes her sore  3-5/10 depending upon what she is doing     Previous pain level was  5/10  .   Changes in function:  As noted above  Adverse reaction to treatment or activity: as noted above    OBJECTIVE    Objective: AROM: Flexion 140, abduction 110, extension/IR nearly full.  Pain end ranges flexion/abduction.  PROM:  Flexion supine 145 with increasing pain at end range, abduction 90 with end range pain.  Strength:  Painful and weak resisting flexion, external rotation and abduction (++).  No longer visible shifting in shoulder with reaching but feels better with movement when shoulder stabilized manually.   She is able to perform home program consisting of range of motion, very light strengthening including small wall push-ups.  Have been unable to progress strengthening significantly due to pain.     Positive Reanna's, positive Marilu.      ASSESSMENT/PLAN  Updated problem list and treatment plan: Diagnosis 1:  Shoulder pain    Pain -  hot/cold therapy, manual therapy, splint/taping/bracing/orthotics, self management, education and home program  Decreased ROM/flexibility - manual therapy, therapeutic exercise and home program  Decreased strength - therapeutic exercise, therapeutic activities and home program  Decreased function - therapeutic  activities and home program  Possible instability:  Home program, trial of taping  STG/LTGs have been met or progress has been made towards goals:  Yes, she has progressed somewhat  Assessment of Progress: The patient's condition is improving slowly  Self Management Plans:  Patient has been instructed in a home treatment program.  Patient  has been instructed in self management of symptoms.    Recommendations:  Continue with treatment.  Request continued therapy order.      Please refer to the daily flowsheet for treatment today, total treatment time and time spent performing 1:1 timed codes.

## 2018-03-29 NOTE — MR AVS SNAPSHOT
"              After Visit Summary   3/29/2018    Terrance Mcwilliams    MRN: [id number]           Patient Information     Date Of Birth          1980        Visit Information        Provider Department      3/29/2018 11:55 AM Carolyn Uribe PT; LANGUAGE [facility] [place]        Today's Diagnoses     Acute pain of left shoulder           Follow-ups after your visit        Who to contact     If you have questions or need follow up information about today's clinic visit or your schedule please contact [facility] directly at [phone].  Normal or non-critical lab and imaging results will be communicated to you by Airpersonshart, letter or phone within 4 business days after the clinic has received the results. If you do not hear from us within 7 days, please contact the clinic through [employer] or phone. If you have a critical or abnormal lab result, we will notify you by phone as soon as possible.  Submit refill requests through GoodChime! or call your pharmacy and they will forward the refill request to us. Please allow 3 business days for your refill to be completed.          Additional Information About Your Visit        MyChart Information     GoodChime! lets you send messages to your doctor, view your test results, renew your prescriptions, schedule appointments and more. To sign up, go to [email or web address] . Click on \"Log in\" on the left side of the screen, which will take you to the Welcome page. Then click on \"Sign up Now\" on the right side of the page.     You will be asked to enter the access code listed below, as well as some personal information. Please follow the directions to create your username and password.     Your access code is: [email or web address]  Expires: 2018 12:31 PM     Your access code will  in 90 days. If you need help or a new code, please call your [facility] clinic or [phone].        Care EveryWhere ID     This is your Care " EveryWhere ID. This could be used by other organizations to access your [facility] medical records  [id number]         Blood Pressure from Last 3 Encounters:   03/29/18 (!) 138/91   02/15/18 127/85   03/31/17 128/85    Weight from Last 3 Encounters:   03/29/18 87.6 kg (193 lb 3.2 oz)   02/15/18 88.6 kg (195 lb 6.4 oz)   03/31/17 83.5 kg (184 lb)              We Performed the Following     Neuromuscular Re-Education     Therapeutic Exercises        Primary Care Provider Office Phone # Fax #    Zoe Edouard [phone]       [address] [address]        Equal Access to Services     SUE GODOY : Eunice Ann, wamaría aguilar, qaybta kaalmada cody, shelly harris. So [facility] [phone].    ATENCIÓN: Si habla español, tiene a dalal disposición servicios gratuitos de asistencia lingüística. Llame al [phone].    We comply with applicable federal civil rights laws and [place] laws. We do not discriminate on the basis of race, color, national origin, age, disability, sex, sexual orientation, or gender identity.            Thank you!     Thank you for choosing [facility] [place]  for your care. Our goal is always to provide you with excellent care. Hearing back from our patients is one way we can continue to improve our services. Please take a few minutes to complete the written survey that you may receive in the mail after your visit with us. Thank you!             Your Updated Medication List - Protect others around you: Learn how to safely use, store and throw away your medicines at www.disposemymeds.org.          This list is accurate as of 3/29/18  7:06 PM.  Always use your most recent med list.                   Brand Name Dispense Instructions for use Diagnosis    hydrocortisone 2.5 % cream    ANUSOL-HC    30 g    Place rectally 2 times daily    Constipation, unspecified constipation type       polyethylene  glycol powder    MIRALAX    119 g    Take 17 g (1 capful) by mouth daily    Constipation, unspecified constipation type       Prenatal Vitamin 27-0.8 MG Tabs     100 tablet    Take 1 tablet by mouth daily    Healthcare maintenance       Psyllium 48.57 % Powd     570 g    Take 1 tsp. by mouth daily    Constipation, unspecified constipation type       TYLENOL PO           vitamin D 2000 UNITS tablet     100 tablet    Take 2,000 Units by mouth daily    Healthcare maintenance

## 2018-03-29 NOTE — MR AVS SNAPSHOT
After Visit Summary   3/29/2018    Terrance Mcwilliams    MRN: [id number]           Patient Information     Date Of Birth          1980        Visit Information        Provider Department      3/29/2018 2:40 PM Zoe Edouard MD [facility]        Today's Diagnoses     Acute pain of left shoulder    -  1      Care Instructions    1. If you need a new letter for work, call clinic.  2. Complete course of PT  3. If something aggravates your shoulder go back to ice, ibuprofen and gentle exercises.   4. For laying on left side, use pillows for support. Talk to PT about sleeping position.   5. Return as needed              Follow-ups after your visit        Who to contact     Please call your clinic at [phone] to:    Ask questions about your health    Make or cancel appointments    Discuss your medicines    Learn about your test results    Speak to your doctor            Additional Information About Your Visit        MyChart Information     ShoppinPal is an electronic gateway that provides easy, online access to your medical records. With ShoppinPal, you can request a clinic appointment, read your test results, renew a prescription or communicate with your care team.     To sign up for Lionexpot visit the website at [email or web address]   You will be asked to enter the access code listed below, as well as some personal information. Please follow the directions to create your username and password.     Your access code is: [email or web address]  Expires: 2018 12:31 PM     Your access code will  in 90 days. If you need help or a new code, please contact your [facility] or call [phone] for assistance.        Care EveryWhere ID     This is your Care EveryWhere ID. This could be used by other organizations to access your [facility] medical records  [id number]        Your Vitals Were     Pulse Temperature Pulse Oximetry BMI (Body Mass Index)          72  98  F (36.7  C) (Oral) 99% 30.26 kg/m2         Blood Pressure from Last 3 Encounters:   03/29/18 (!) 138/91   02/15/18 127/85   03/31/17 128/85    Weight from Last 3 Encounters:   03/29/18 193 lb 3.2 oz (87.6 kg)   02/15/18 195 lb 6.4 oz (88.6 kg)   03/31/17 184 lb (83.5 kg)              Today, you had the following     No orders found for display       Primary Care Provider Office Phone # Fax #    Zoe Edouard [phone]       [address] [address]        Equal Access to Services     SUE GODOY : Eunice Ann, sha aguilar, endy ross, shelly harris. So [facility] [phone].    ATENCIÓN: Si habla español, tiene a dalal disposición servicios gratuitos de asistencia lingüística. Llame al [phone].    We comply with applicable federal civil rights laws and [place] laws. We do not discriminate on the basis of race, color, national origin, age, disability, sex, sexual orientation, or gender identity.            Thank you!     Thank you for choosing [facility]  for your care. Our goal is always to provide you with excellent care. Hearing back from our patients is one way we can continue to improve our services. Please take a few minutes to complete the written survey that you may receive in the mail after your visit with us. Thank you!             Your Updated Medication List - Protect others around you: Learn how to safely use, store and throw away your medicines at www.disposemymeds.org.          This list is accurate as of 3/29/18  3:00 PM.  Always use your most recent med list.                   Brand Name Dispense Instructions for use Diagnosis    hydrocortisone 2.5 % cream    ANUSOL-HC    30 g    Place rectally 2 times daily    Constipation, unspecified constipation type       polyethylene glycol powder    MIRALAX    119 g    Take 17 g (1 capful) by mouth daily    Constipation, unspecified  constipation type       Prenatal Vitamin 27-0.8 MG Tabs     100 tablet    Take 1 tablet by mouth daily    Healthcare maintenance       Psyllium 48.57 % Powd     570 g    Take 1 tsp. by mouth daily    Constipation, unspecified constipation type       TYLENOL PO           vitamin D 2000 UNITS tablet     100 tablet    Take 2,000 Units by mouth daily    Healthcare maintenance

## 2018-03-29 NOTE — PROGRESS NOTES
HPI       Terarnce Mcwilliams is a 38 year old female Patient seen  for a Follow Upof an injury covered by Workman's Compensation, 6 week follow up.     Seeing PT once weekly, this is helping. Getting better. Uses an arm bike at PT and strength exercises. Sometimes she is sore following PT. In general pt feels like strength and range is better. Over head reaching can still make her sore but much better. Numbness is gone. Denies trouble with movement of finger or hand. Denies pain today or needing ibuprofen or ice. Working right now, no restrictions. Does not need new letter to share with work. Can comb her hair and take a shower.     If patient lays on L side it is a problem. This is the problem she likes to sleep on. Questions about sleep position.   Reports pain with flexion at the elbow.     Workplace injury   Worker's Compensation Covered Injury    When?: Jan 22, 2018    What Happened?  Fell in front of store on the ice. Unknown mechanism she does not recall. Went to [facility].  She had x-rays there which showed no acute fracture.    Where ? Left rib cage, up to axilla, and from the shoulder down to her down to hand - swollen.  The swelling has greatly improved if not completely resolved.  Work Related? Yes    Place of employment [employer],   Able to Work? Yes but without any formal restrictions, returned to full work.  Job requirements:lifting/carrying        Active medical problems and medication list reviewed          Review of Systems:   No fevers, weight loss, bowel / bladder incontinence or localized weakness.         This document serves as a record of the services and decisions personally performed and made by Zoe Edouard MD. It was created on his/her behalf by Tracey Healy, a trained medical scribe. The creation of this document is based the provider's statements to the medical scribe.  Andrade Healy 2:59 PM, March 29, 2018         Physical Exam:     Vitals:    03/29/18 1421  03/29/18 1424   BP: (!) 143/94 (!) 138/91   Pulse: 72    Temp: 98  F (36.7  C)    TempSrc: Oral    SpO2: 99%    Weight: 193 lb 3.2 oz (87.6 kg)        MUSCULOSKELETAL: Shoulder appearance is normal. Non tender: Clavicle, AC, acromion, scapular spine neck, coracoid, shoulder muscles, GH joint and biceps tendon. Left shoulder elevation 120 degrees, with some pain in deltoid area at end of range. External rotation limited by 5 degrees. Internal rotation to lumbar spine   Antecubital fossa normal, no skin changes. Tender over biceps insertion         Results      Results from the last 24 hoursNo results found for this or any previous visit (from the past 24 hour(s)).  Assessment and Plan     Terrance was seen today for shoulder pain.    Diagnoses and all orders for this visit:    Acute pain of left shoulder  Work Restrictions:full duty  1. If you need a new letter for work, call clinic. (Declines today)  2. Complete course of PT  3. If something aggravates your shoulder go back to ice, ibuprofen and gentle exercises. We demonstrated some positioning changes for improved sleep  4. For laying on left side, use pillows for support. Talk to PT about sleeping position.   5. Return as needed      Recommend f/u for routine health maintenance.       Options for treatment and follow-up care were reviewed with the patient and/or guardian. Terrance Mcwilliams and/or guardian engaged in the decision making process and verbalized understanding of the options discussed and agreed with the final plan.    The information in this document, created by the medical scribe for me, accurately reflects the services I personally performed and the decisions made by me. I have reviewed and approved this document for accuracy prior to leaving the patient care area.  Zoe Edouard MD  2:59 PM, 03/29/18  I spent 26 min face to face with the patient and >50% was spent counselling the patient about the above medical conditions, educating patient on their  medical conditions, behavioral interventions and supports.      Zoe Edouard MD

## 2018-04-06 ENCOUNTER — TELEPHONE (OUTPATIENT)
Dept: PHYSICAL THERAPY | Facility: CLINIC | Age: 38
End: 2018-04-06

## 2018-04-06 DIAGNOSIS — M25.512 ACUTE PAIN OF LEFT SHOULDER: ICD-10-CM

## 2018-04-06 NOTE — TELEPHONE ENCOUNTER
Requested auth for additional treatments per progress report/rec by therapist and per MD instructions.  Verbal agreement given for additional 6 visits, will send current DC.

## 2018-04-10 ENCOUNTER — OFFICE VISIT (OUTPATIENT)
Dept: FAMILY MEDICINE | Facility: CLINIC | Age: 38
End: 2018-04-10
Payer: COMMERCIAL

## 2018-04-10 VITALS
BODY MASS INDEX: 30.38 KG/M2 | OXYGEN SATURATION: 100 % | DIASTOLIC BLOOD PRESSURE: 85 MMHG | WEIGHT: 194 LBS | HEART RATE: 70 BPM | TEMPERATURE: 98 F | SYSTOLIC BLOOD PRESSURE: 125 MMHG

## 2018-04-10 DIAGNOSIS — H69.91 DYSFUNCTION OF RIGHT EUSTACHIAN TUBE: Primary | ICD-10-CM

## 2018-04-10 DIAGNOSIS — J06.9 VIRAL URI: ICD-10-CM

## 2018-04-10 RX ORDER — CETIRIZINE HYDROCHLORIDE 10 MG/1
10 TABLET ORAL DAILY
Qty: 30 TABLET | Refills: 3 | Status: SHIPPED | OUTPATIENT
Start: 2018-04-10 | End: 2018-06-15

## 2018-04-10 ASSESSMENT — PAIN SCALES - GENERAL: PAINLEVEL: MILD PAIN (2)

## 2018-04-10 ASSESSMENT — ENCOUNTER SYMPTOMS
VOMITING: 0
CHILLS: 0
NAUSEA: 0
RHINORRHEA: 1
APPETITE CHANGE: 0
FEVER: 0
FATIGUE: 0
COUGH: 0

## 2018-04-10 NOTE — PROGRESS NOTES
HPI:       Terrance Mcwilliams is a 38 year old who presents for the following  Patient presents with:  Mass: here today for swollen glands on the right side for the last 3 days.  Also says her ear hurts on the right side as well.  This has been an ongoing issue for the patient since she was a child.        Concern: swollen gland on right neck for 3 days   Description of the problem :  Three days ago, she noticed a swollen gland over her right neck/throat. Mild ache in her right ear, but denies pain.  Denies cough, sore throat, fever, chills, n/v, or appetite changes. +rhinorrhea all winter.  Denies exposure to illnesses. Has not used any medications for her symptoms.     She says since she was a child, she has had a problem with her tonsils being swollen when she is sick with difficulty swallowing. She also says that as an adult, she noticed there is sometimes a smell and she will notice white spots in her throat.     [employer]  was used for  this visit.      Problem, Medication and Allergy Lists were reviewed and are current.  Patient is an established patient of this clinic.         Review of Systems:   Review of Systems   Constitutional: Negative for appetite change, chills, fatigue and fever.   HENT: Positive for rhinorrhea. Negative for congestion, ear pain, postnasal drip and sneezing.    Respiratory: Negative for cough.    Gastrointestinal: Negative for nausea and vomiting.             Physical Exam:   Patient Vitals for the past 24 hrs:   BP Temp Temp src Pulse SpO2 Weight   04/10/18 1416 125/85 - - - - -   04/10/18 1410 140/83 98  F (36.7  C) Oral 70 100 % 194 lb (88 kg)     Body mass index is 30.38 kg/(m^2).  Vitals were reviewed and were normal     Physical Exam   Constitutional: She is oriented to person, place, and time. She appears well-developed and well-nourished. No distress.   HENT:   Right TM pearly gray with fluid bubbles posterior. Left TM WNL. Oropharynx mild erythema and mild  cobblestoning.    Eyes: Conjunctivae are normal.   Neck: Neck supple. No thyromegaly present.   +cervical lymph node (pea size) palpable along posterior cervical chain   Cardiovascular: Normal rate, regular rhythm and normal heart sounds.  Exam reveals no gallop and no friction rub.    No murmur heard.  Pulmonary/Chest: Effort normal and breath sounds normal. No respiratory distress. She has no wheezes. She has no rales.   Neurological: She is alert and oriented to person, place, and time.   Psychiatric: She has a normal mood and affect.         Results:     None    Assessment and Plan     Terrance was seen today for swollen gland.    Diagnoses and all orders for this visit:    Dysfunction of right eustachian tube  cetirizine (ZYRTEC) 10 MG tablet; Take 1 tablet (10 mg) by mouth daily- take scheduled daily for a few weeks and then as needed  No signs of infection present.     Viral URI  Can try to gargle warm salt water for throat pain/smell. May use tylenol as needed for soreness.   Patient's symptoms most consistent with a viral URI, no signs of pneumonia, sinus infection, or otitis media to suggest a need for antibiotics.  Discussed viral cause, typical course, symptomatic treatment and when to follow-up.     Follow-up with no improvement or worsening of symptoms.     Over 50% of 25 minute appointment was spent on counseling and education regarding above issues.     Options for treatment and follow-up care were reviewed with the patient. Terrance Mcwilliams  engaged in the decision making process and verbalized understanding of the options discussed and agreed with the final plan.    Deya Fierro RN, DNP-FNP student [facility]    History and physical examination done with student nurse practitioner.  Student acted as scribe for this encounter.  I agree with assessment and plan for this patient.     Joyce Arnold, BELKIS CNP

## 2018-04-10 NOTE — PATIENT INSTRUCTIONS
Diagnoses and all orders for this visit:    Dysfunction of right eustachian tube  -     cetirizine (ZYRTEC) 10 MG tablet; Take 1 tablet (10 mg) by mouth daily- take scheduled daily for a few weeks and then as needed    Return to clinic if symptoms worsen or fever develops.    Thank you for coming to [facility].  Lab Testing:  **If you had lab testing today and your results are reassuring or normal they will be mailed to you or sent through [employer] within 7 days.   **If the lab tests need quick action we will call you with the results.  The phone number we will call with results is # [phone] (home) [phone] (work). If this is not the best number please call our clinic and change the number.    Medication Refills:  If you need any refills please call your pharmacy and they will contact us.   If you need to  your refill at a new pharmacy, please contact the new pharmacy directly. The new pharmacy will help you get your medications transferred faster.     Scheduling:  If you have any concerns about today's visit or wish to schedule another appointment please call our office during normal business hours [phone] (8-5:00 M-F)    Medical Concerns:  If you have urgent medical concerns please call [phone] at any time of the day.  If you have a medical emergency please call [phone].  Again thank you for choosing [facility] and please let us know how we can best partner with you to improve you and your family's health.

## 2018-04-10 NOTE — MR AVS SNAPSHOT
After Visit Summary   4/10/2018    Terrance Mcwilliams    MRN: [id number]           Patient Information     Date Of Birth          1980        Visit Information        Provider Department      4/10/2018 2:00 PM Joyce Arnold APRN CNP [facility]        Today's Diagnoses     Dysfunction of right eustachian tube    -  1      Care Instructions    Diagnoses and all orders for this visit:    Dysfunction of right eustachian tube  -     cetirizine (ZYRTEC) 10 MG tablet; Take 1 tablet (10 mg) by mouth daily- take scheduled daily for a few weeks and then as needed    Return to clinic if symptoms worsen or fever develops.    Thank you for coming to [facility].  Lab Testing:  **If you had lab testing today and your results are reassuring or normal they will be mailed to you or sent through [employer] within 7 days.   **If the lab tests need quick action we will call you with the results.  The phone number we will call with results is # [phone] (home) [phone] (work). If this is not the best number please call our clinic and change the number.    Medication Refills:  If you need any refills please call your pharmacy and they will contact us.   If you need to  your refill at a new pharmacy, please contact the new pharmacy directly. The new pharmacy will help you get your medications transferred faster.     Scheduling:  If you have any concerns about today's visit or wish to schedule another appointment please call our office during normal business hours [phone] (8-5:00 M-F)    Medical Concerns:  If you have urgent medical concerns please call [phone] at any time of the day.  If you have a medical emergency please call [phone].  Again thank you for choosing [facility] and please let us know how we can best partner with you to improve you and your family's health.          Follow-ups after your visit        Who to contact      Please call your clinic at [phone] to:    Ask questions about your health    Make or cancel appointments    Discuss your medicines    Learn about your test results    Speak to your doctor            Additional Information About Your Visit        MyChart Information     [employer] is an electronic gateway that provides easy, online access to your medical records. With [employer], you can request a clinic appointment, read your test results, renew a prescription or communicate with your care team.     To sign up for [employer] visit the website at [email or web address]   You will be asked to enter the access code listed below, as well as some personal information. Please follow the directions to create your username and password.     Your access code is: [email or web address]  Expires: 2018 12:31 PM     Your access code will  in 90 days. If you need help or a new code, please contact your [facility] or call [phone] for assistance.        Care EveryWhere ID     This is your Care EveryWhere ID. This could be used by other organizations to access your [place] medical records  [id number]        Your Vitals Were     Pulse Temperature Last Period Pulse Oximetry BMI (Body Mass Index)       70 98  F (36.7  C) (Oral) 2018 (Within Days) 100% 30.38 kg/m2        Blood Pressure from Last 3 Encounters:   04/10/18 125/85   18 (!) 138/91   02/15/18 127/85    Weight from Last 3 Encounters:   04/10/18 194 lb (88 kg)   18 193 lb 3.2 oz (87.6 kg)   02/15/18 195 lb 6.4 oz (88.6 kg)              Today, you had the following     No orders found for display         Today's Medication Changes          These changes are accurate as of 4/10/18  2:39 PM.  If you have any questions, ask your nurse or doctor.               Stop taking these medicines if you haven't already. Please contact your care team if you have questions.     hydrocortisone 2.5 % cream   Commonly known as:  ANUSOL-HC    Stopped by:  Joyce Arnold APRN CNP           polyethylene glycol powder   Commonly known as:  MIRALAX   Stopped by:  Joyce Arnold APRN CNP           Prenatal Vitamin 27-0.8 MG Tabs   Stopped by:  Joyce Arnold APRN CNP           Psyllium 48.57 % Powd   Stopped by:  Joyce Arnold APRN CNP           vitamin D 2000 units tablet   Stopped by:  Joyce Arnold APRN CNP                    Primary Care Provider Office Phone # Fax #    Zoefaviola Edouard [phone]       [address] [address]        Equal Access to Services     [facility]: Hadii billy berman hadasho Soomaali, waaxda luqadaha, qaybta kaalmada ryanyajavon, shelly wei . So [facility] [phone].    ATENCIÓN: Si habla español, tiene a dalal disposición servicios gratuitos de asistencia lingüística. [facility] [phone].    We comply with applicable federal civil rights laws and [place] laws. We do not discriminate on the basis of race, color, national origin, age, disability, sex, sexual orientation, or gender identity.            Thank you!     Thank you for choosing [facility]  for your care. Our goal is always to provide you with excellent care. Hearing back from our patients is one way we can continue to improve our services. Please take a few minutes to complete the written survey that you may receive in the mail after your visit with us. Thank you!             Your Updated Medication List - Protect others around you: Learn how to safely use, store and throw away your medicines at www.disposemymeds.org.          This list is accurate as of 4/10/18  2:39 PM.  Always use your most recent med list.                   Brand Name Dispense Instructions for use Diagnosis    TYLENOL PO

## 2018-04-17 ENCOUNTER — DOCUMENTATION ONLY (OUTPATIENT)
Dept: FAMILY MEDICINE | Facility: CLINIC | Age: 38
End: 2018-04-17

## 2018-04-17 NOTE — PROGRESS NOTES
"When opening a documentation only encounter, be sure to enter in \"Chief Complaint\" Forms and in \" Comments\" Title of form, description if needed.    Terrance is a 38 year old  female  Form received via: Fax  Form now resides in: Provider Ready    Andreia Clark        Form has been completed by provider.     Form sent out via: Fax to Jared  at Fax Number: [phone]  Patient informed: No  Output date: June 1, 2018    Andreia Clark      **Please close the encounter**              "

## 2018-05-29 ENCOUNTER — OFFICE VISIT (OUTPATIENT)
Dept: FAMILY MEDICINE | Facility: CLINIC | Age: 38
End: 2018-05-29
Payer: COMMERCIAL

## 2018-05-29 VITALS
WEIGHT: 185.8 LBS | DIASTOLIC BLOOD PRESSURE: 83 MMHG | BODY MASS INDEX: 29.1 KG/M2 | TEMPERATURE: 98.2 F | OXYGEN SATURATION: 96 % | SYSTOLIC BLOOD PRESSURE: 120 MMHG | HEART RATE: 73 BPM

## 2018-05-29 DIAGNOSIS — B07.0 PLANTAR WARTS: Primary | ICD-10-CM

## 2018-05-29 DIAGNOSIS — D17.23 LIPOMA OF RIGHT LOWER EXTREMITY: ICD-10-CM

## 2018-05-29 NOTE — PROGRESS NOTES
Preceptor Attestation:   Patient seen, evaluated and discussed with the resident. I was present for and supervised the entire procedure. I have verified the content of the note, which accurately reflects my assessment of the patient and the plan of care.   Supervising Physician:  Zoe Edouard MD

## 2018-05-29 NOTE — PROGRESS NOTES
HPI:       Terrance Mcwilliams is a 38 year old who presents for the following  Patient presents with:  Pain: lump right calf, lump bottom left foot, ongoing      Terrance Mcwilliams is a 39 y/o female who presents with wart of bottom of left foot and right lump of her calf.  Both have been present for a few years.  She's had the plantar wart on the bottom of her foot frozen twice (not regularly) and it regrows.      For her right lower extremity lump, she says herbal regimen helps it decrease in size but when she stops it it regrows.  It has been present for 3 years.  Its usually not painful.  No injury to that leg.  No other similar lumps.    A [employer]  was used for  this visit.      Problem, Medication and Allergy Lists were reviewed and are current.  Patient is an established patient of this clinic.         Review of Systems:   Review of Systems   Constitutional: Negative for fever.   Skin: Negative for rash and wound.             Physical Exam:   Patient Vitals for the past 24 hrs:   BP Temp Temp src Pulse SpO2 Weight   18 1101 120/83 98.2  F (36.8  C) Oral 73 96 % 185 lb 12.8 oz (84.3 kg)     Body mass index is 29.1 kg/(m^2).  Vitals were reviewed and were normal     Physical Exam   Constitutional: She is oriented to person, place, and time. She appears well-developed and well-nourished. No distress.   Neurological: She is alert and oriented to person, place, and time.   Skin: Skin is warm and dry.   Left foot: 5mm callous-like thickening of plantar surface with central lesion  Right anterior lower lex3cm soft tissue lesion   Psychiatric: She has a normal mood and affect. Her behavior is normal. Judgment and thought content normal.     POC ultrasound of right mid-lateral anterior leg showed 2x3cm isoechoic lesion with regular thin border.      Procedure Note     [facility]  Procedure Note    Terrance Mcwilliams is a patient of Zoe Membreno here for wart cryotherapy  treatment.    Consent: Risks, benefits and alternatives were discussed. Patient's questions were elicited and answered. Verbal consent was obtained.    Preoperative Diagnosis: Plantar wart  Postoperative Diagnosis: same  Location:   LEFT foot    Technique:   Lesion(s) pared with 10 blade scalpel.  Liquid Nitrogen (cryotherapy) x  2 cycles applied to plantar wart.     Complications:  None  Tolerance: Pt tolerated procedure well and was in stable condition.     Follow up: Pt was instructed to expect the area to darken. Some dead skin may fall off. Instructed to pare lesion with file or pumice stone.    Follow up in 1-2 weeks.      Resident: Markus Gutierrez  Faculty: oZe Edouard MD present for and supervised this entire procedure.  Assessment and Plan     Terrance was seen today for left plantar wart and right lower extremity lesion.    Plantar wart  See procedure not above, patient tolerated paring and cryotherapy well.  Possible that lesion is plantar corn although with maximum pain squeezing side to side and rapid recurrence after shaving, most consistent with plantar wart.  Recommend follow up in 1-2 weeks for repeat treatment.    -     DESTRUCT BENIGN LESION, UP TO 14    Lipoma of right lower extremity  2-3cm soft tissue mass on mid-lateral right shin with most consistent with lipoma.  Encouraged monitoring it.  If not tolerable to bothers her enough, can send to general surgery for removal.      There are no discontinued medications.  Options for treatment and follow-up care were reviewed with the patient. Juan Antoniosameer Mcwilliams  engaged in the decision making process and verbalized understanding of the options discussed and agreed with the final plan.    Markus Gutierrez,

## 2018-05-29 NOTE — MR AVS SNAPSHOT
After Visit Summary   2018    Terrance Mcwilliams    MRN: [id number]           Patient Information     Date Of Birth          1980        Visit Information        Provider Department      2018 11:00 AM Markus Gutierrez DO [facility]        Today's Diagnoses     Plantar warts    -  1    Lipoma of right lower extremity           Follow-ups after your visit        Follow-up notes from your care team     Return in about 2 weeks (around 2018).      Your next 10 appointments already scheduled     Henrique 15, 2018  2:00 PM CDT   PHYSICAL with MD [facility] ([facility])    [address],  [address]  [place] [address]   [phone]              Who to contact     Please call your clinic at [phone] to:    Ask questions about your health    Make or cancel appointments    Discuss your medicines    Learn about your test results    Speak to your doctor            Additional Information About Your Visit        MyChart Information     [employer] is an electronic gateway that provides easy, online access to your medical records. With [employer], you can request a clinic appointment, read your test results, renew a prescription or communicate with your care team.     To sign up for [employer] visit the website at [email or web address]   You will be asked to enter the access code listed below, as well as some personal information. Please follow the directions to create your username and password.     Your access code is: [email or web address]  Expires: 2018  1:18 PM     Your access code will  in 90 days. If you need help or a new code, please contact your [facility] or call [phone] for assistance.        Care EveryWhere ID     This is your Care EveryWhere ID. This could be used by other organizations to access your [place] medical records  [id number]        Your Vitals Were     Pulse  Temperature Pulse Oximetry BMI (Body Mass Index)          73 98.2  F (36.8  C) (Oral) 96% 29.1 kg/m2         Blood Pressure from Last 3 Encounters:   05/29/18 120/83   04/10/18 125/85   03/29/18 (!) 138/91    Weight from Last 3 Encounters:   05/29/18 185 lb 12.8 oz (84.3 kg)   04/10/18 194 lb (88 kg)   03/29/18 193 lb 3.2 oz (87.6 kg)              We Performed the Following     DESTRUCT BENIGN LESION, UP TO 14        Primary Care Provider Office Phone # Fax #    Zoe Edouard [phone]       [address] [address]        Equal Access to Services     SUE GODOY : Eunice manriqueo Seth, waaxda luqadaha, qaybta kaalmada adeegyada, shelly wei . So [facility] [phone].    ATENCIÓN: Si habla español, tiene a dalal disposición servicios gratuitos de asistencia lingüística. Llame al [phone].    We comply with applicable federal civil rights laws and [place] laws. We do not discriminate on the basis of race, color, national origin, age, disability, sex, sexual orientation, or gender identity.            Thank you!     Thank you for choosing [facility]  for your care. Our goal is always to provide you with excellent care. Hearing back from our patients is one way we can continue to improve our services. Please take a few minutes to complete the written survey that you may receive in the mail after your visit with us. Thank you!             Your Updated Medication List - Protect others around you: Learn how to safely use, store and throw away your medicines at www.disposemymeds.org.          This list is accurate as of 5/29/18 11:59 PM.  Always use your most recent med list.                   Brand Name Dispense Instructions for use Diagnosis    cetirizine 10 MG tablet    zyrTEC    30 tablet    Take 1 tablet (10 mg) by mouth daily    Dysfunction of right eustachian tube       TYLENOL PO

## 2018-05-30 ASSESSMENT — ENCOUNTER SYMPTOMS
FEVER: 0
WOUND: 0

## 2018-06-11 ENCOUNTER — OFFICE VISIT (OUTPATIENT)
Dept: FAMILY MEDICINE | Facility: CLINIC | Age: 38
End: 2018-06-11
Payer: COMMERCIAL

## 2018-06-11 VITALS
WEIGHT: 184.4 LBS | DIASTOLIC BLOOD PRESSURE: 84 MMHG | HEART RATE: 68 BPM | TEMPERATURE: 98.1 F | SYSTOLIC BLOOD PRESSURE: 131 MMHG | RESPIRATION RATE: 16 BRPM | BODY MASS INDEX: 28.88 KG/M2 | OXYGEN SATURATION: 100 %

## 2018-06-11 DIAGNOSIS — K59.00 CONSTIPATION, UNSPECIFIED CONSTIPATION TYPE: ICD-10-CM

## 2018-06-11 DIAGNOSIS — Z00.00 PREVENTATIVE HEALTH CARE: ICD-10-CM

## 2018-06-11 DIAGNOSIS — R10.30 LOWER ABDOMINAL PAIN: Primary | ICD-10-CM

## 2018-06-11 DIAGNOSIS — L84 CORN OR CALLUS: ICD-10-CM

## 2018-06-11 RX ORDER — POLYETHYLENE GLYCOL 3350 17 G/17G
1 POWDER, FOR SOLUTION ORAL DAILY
Qty: 510 G | Refills: 1 | Status: SHIPPED | OUTPATIENT
Start: 2018-06-11 | End: 2018-06-11

## 2018-06-11 RX ORDER — POLYETHYLENE GLYCOL 3350 17 G/17G
1 POWDER, FOR SOLUTION ORAL DAILY
Qty: 510 G | Refills: 1 | Status: SHIPPED | OUTPATIENT
Start: 2018-06-11 | End: 2018-09-04

## 2018-06-11 ASSESSMENT — ENCOUNTER SYMPTOMS
ACTIVITY CHANGE: 1
RESPIRATORY NEGATIVE: 1
VOMITING: 0
NAUSEA: 1
CARDIOVASCULAR NEGATIVE: 1
CONSTIPATION: 1
ABDOMINAL PAIN: 1
ROS SKIN COMMENTS: SEE HPI

## 2018-06-11 NOTE — PATIENT INSTRUCTIONS
Here is the plan from today's visit    1. Preventative health care  - Pap imaged thin layer screen with HPV - recommended age 30 - 65 years (select HPV order below)  - HPV High Risk Types DNA Cervical    2. Constipation, unspecified constipation type  - polyethylene glycol (MIRALAX) powder; Take 17 g (1 capful) by mouth daily  Dispense: 510 g; Refill: 1  Increase water intake.  Increase fruits and vegetables.      3. Lower abdominal pain  - US Pel W/Trans*; Future      Follow-up after pelvic ultrasound completed, sooner as needed.     Thank you for coming to [facility] today.  Lab Testing:  **If you had lab testing today and your results are reassuring or normal they will be mailed to you or sent through BUILD within 7 days.   **If the lab tests need quick action we will call you with the results.  The phone number we will call with results is # [phone] (home) [phone] (work). If this is not the best number please call our clinic and change the number.  Medication Refills:  If you need any refills please call your pharmacy and they will contact us.   If you need to  your refill at a new pharmacy, please contact the new pharmacy directly. The new pharmacy will help you get your medications transferred faster.   Scheduling:  If you have any concerns about today's visit or wish to schedule another appointment please call our office during normal business hours [phone] (8-5:00 M-F)  If a referral was made to a [facility] and you don't get a call from central scheduling please call [phone].  If a Mammogram was ordered for you at [facility] call [phone] to schedule or change your appointment.  If you had an XRay/CT/Ultrasound/MRI ordered the number is [phone] to schedule or change your radiology appointment.   Medical Concerns:  If you have urgent medical concerns please call [phone] at any time of the day.  If you have a medical emergency  please call 242.

## 2018-06-11 NOTE — NURSING NOTE
Due to patient being non-English speaking/uses sign language, an  was used for this visit. Only for face-to-face interpretation by an external agency, date and length of interpretation can be found on the scanned worksheet.     name: Krupa Car  Agency: [employer]  Language: Omani   Telephone number: [phone]  Type of interpretation: Face-to-face, spoken    Chinyere Moralez CMA

## 2018-06-11 NOTE — PROGRESS NOTES
HPI:       Terrance Mcwilliams is a 38 year old who presents for the following  Patient presents with:  Derm Problem: wart treatment to the left foot.   Abdominal Pain: lower left side abdominal pain flare up over the last 3 weeks. nausea during severe pain    1. Patient is here for treatment of plantar wart on left foot.  Was last seen in clinic on 18 where hyperkeratotic lesion was thought to be plantar wart vs. Corn.      2.  Patient reports left lower abdominal pain, which radiates to entire lower abdomen and uterus area.  Onset 3 weeks ago.  Has had nausea associated with severe pain. Pain has been intermittent.  Previously had this rarely, but now with more consistent pain episodes.    Not associated with menses.   +Constipation, last bowel movement was this morning (soft and small) and typically has bowel movements once weekly.    Was taking Senna for the entire week previously to help with bowel movements.      LMP:  2.5 months ago.  History of irregular menses over the past one year.    No chance of pregnancy.          A Chilean  was used for  this visit.          Problem, Medication and Allergy Lists were   reviewed and are current.     Patient Active Problem List    Diagnosis Date Noted     Acute pain of left shoulder 2018     Priority: Medium     Hyperopic astigmatism of both eyes 2015     Priority: Medium     Presbyopia 2015     Priority: Medium     Vitamin D deficiency 2014     Priority: Medium     20 (2013)       Plantar warts 2014     Priority: Medium     Older than stated age ( 1973) 2014     Priority: Medium     Diminished ovarian reserve/per Dr. Taveras 2013     Priority: Medium     Recommend oocyte donor, patient declines.          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 Provider to review and confirm.)     ,     Current Outpatient Prescriptions   Medication Sig Dispense Refill     Acetaminophen (TYLENOL PO)        cetirizine (ZYRTEC) 10 MG tablet Take 1 tablet (10 mg) by mouth daily (Patient not taking: Reported on 5/29/2018) 30 tablet 3   ,   No Known Allergies  Patient is an established patient of this clinic.         Review of Systems:   Review of Systems   Constitutional: Positive for activity change.   Respiratory: Negative.    Cardiovascular: Negative.    Gastrointestinal: Positive for abdominal pain, constipation and nausea (with pain episodes). Negative for vomiting.   Genitourinary: Negative.    Skin:        See HPI             Physical Exam:   Patient Vitals for the past 24 hrs:   BP Temp Temp src Pulse Resp SpO2 Weight   06/11/18 1419 131/84 98.1  F (36.7  C) Oral 68 16 100 % 184 lb 6.4 oz (83.6 kg)     Body mass index is 28.88 kg/(m^2).  Vitals were reviewed and were normal     Physical Exam   Constitutional: She appears well-nourished. No distress.   Cardiovascular: Normal rate and regular rhythm.    Pulmonary/Chest: Effort normal and breath sounds normal. No respiratory distress. She has no wheezes.   Abdominal: Soft. Bowel sounds are decreased. There is tenderness in the right lower quadrant. There is no rebound and no guarding.   Genitourinary: There is no rash or tenderness on the right labia. There is no rash or tenderness on the left labia. Uterus is not tender. Right adnexum displays no tenderness. Left adnexum displays no tenderness. No tenderness in the vagina. No vaginal discharge found.         Results:     Assessment and Plan   Terrance was seen today for derm problem and abdominal pain.    Diagnoses and all orders for this visit:    Lower abdominal pain  -     US Pel W/Trans*;  Future - scheduled for 6/12/18  -     May use Tylenol and warm heat application as needed.      Constipation, unspecified constipation type  -     polyethylene glycol (MIRALAX) powder; Take 17 g (1 capful) by mouth daily  -     Discussed importance of prevention of constipation with use of Miralax, increased fluids and fruits/vegetables.      Preventative health care  -     Pap imaged thin layer screen with HPV - recommended age 30 - 65 years (select HPV order below)  -     HPV High Risk Types DNA Cervical    Follow-up in clinic after pelvic ultrasound, sooner as needed with any worsening of pain.        Options for treatment and follow-up care were reviewed with the patient. Terrance Mcwilliams  engaged in the decision making process and verbalized understanding of the options discussed and agreed with the final plan.    Joyce Arnold, BELKIS CNP

## 2018-06-11 NOTE — LETTER
June 19, 2018      Terrance Mcwilliams  [address]   [place] [address]        Dear Terrance,    Thank you for getting your care at [facility]. Please see below for your test results.    Your pap smear and HPV (human papilloma virus) results were normal and reassuring.     Resulted Orders   HPV High Risk Types DNA Cervical   Result Value Ref Range    HPV Source SurePath     HPV 16 DNA Negative NEG^Negative    HPV 18 DNA Negative NEG^Negative    Other HR HPV Negative NEG^Negative    Final Diagnosis This patient's sample is negative for HPV DNA.       Comment:      This test was developed and its performance characteristics determined by the   [facility]. It   has not been cleared or approved by the FDA. The laboratory is regulated under   CLIA as qualified to perform high-complexity testing. This test is used for   clinical purposes. It should not be regarded as investigational or for   research.  (Note)  METHODOLOGY:  The Roche amber 4800 system uses automated extraction,   simultaneous amplification of HPV (L1 region) and beta-globin,    followed by  real time detection of fluorescent labeled HPV and beta   globin using specific oligonucleotide probes . The test specifically   identifies types HPV 16 DNA and HPV 18 DNA while concurrently   detecting the rest of the high risk types (31, 33, 35, 39, 45, 51,   52, 56, 58, 59, 66 or 68).  COMMENTS:  This test is not intended for use as a screening device   for women under age 30 with normal cervical cytology.  Results should   be correl  ated with cytologic and histologic findings. Close clinical   followup is recommended.      Specimen Description Cervical Cells       Comment:      [id number]   A pap thin layer screen   Result Value Ref Range    PAP NIL     Copath Report         Acc#: [id number]   Signed: 6/13/2018 10:29   MR#: [id number]    SPECIMEN/STAIN PROCESS:  Pap thin layer prep  screening (Surepath)       Pap-Cyto x 1, HPV ordered x 1    SOURCE: Cervical, endocervical  ----------------------------------------------------------------   Pap thin layer prep screening (Surepath)  SPECIMEN ADEQUACY:  Satisfactory for evaluation.  Specimen was unable to be imaged on the   [email or web address] Slide .  -Transformation zone component present.    CYTOLOGIC INTERPRETATION:    Negative for intraepithelial lesion or malignancy    Electronically signed by:  MAGDI Garcia (ASCP)    CLINICAL HISTORY:    Currently not having periods  Irregular periods, Previous normal pap  Date of Last Pap: 12/10/2013,    Papanicolaou Test Limitations:  Cervical cytology is a screening test with   limited sensitivity; regular  screening is critical for cancer prevention; Pap tests are primarily   effective for the diagnosis/prevention of  squamous cell carcinoma, not adenocarcinomas  or other cancers.  TESTING LAB LOCATION:  [facility] [address] [address], [phone]  Processed and screened at [facility],   [facility]         If you have any concerns about these results please call and leave a message for me or send a MyChart message to the clinic.    Sincerely,    BELKIS Cerrato CNP

## 2018-06-11 NOTE — MR AVS SNAPSHOT
After Visit Summary   6/11/2018    Terrance Mcwilliams    MRN: [id number]           Patient Information     Date Of Birth          1980        Visit Information        Provider Department      6/11/2018 2:20 PM Joyce Arnold APRN CNP [facility]        Today's Diagnoses     Preventative health care    -  1    Constipation, unspecified constipation type        Lower abdominal pain          Care Instructions    Here is the plan from today's visit    1. Preventative health care  - Pap imaged thin layer screen with HPV - recommended age 30 - 65 years (select HPV order below)  - HPV High Risk Types DNA Cervical    2. Constipation, unspecified constipation type  - polyethylene glycol (MIRALAX) powder; Take 17 g (1 capful) by mouth daily  Dispense: 510 g; Refill: 1  Increase water intake.  Increase fruits and vegetables.      3. Lower abdominal pain  - US Pel W/Trans*; Future      Follow-up after pelvic ultrasound completed, sooner as needed.     Thank you for coming to [facility] today.  Lab Testing:  **If you had lab testing today and your results are reassuring or normal they will be mailed to you or sent through Apmetrix within 7 days.   **If the lab tests need quick action we will call you with the results.  The phone number we will call with results is # [phone] (home) [phone] (work). If this is not the best number please call our clinic and change the number.  Medication Refills:  If you need any refills please call your pharmacy and they will contact us.   If you need to  your refill at a new pharmacy, please contact the new pharmacy directly. The new pharmacy will help you get your medications transferred faster.   Scheduling:  If you have any concerns about today's visit or wish to schedule another appointment please call our office during normal business hours [phone] (8-5:00 M-F)  If a referral was made to a [facility]  and you don't get a call from central scheduling please call [phone].  If a Mammogram was ordered for you at [facility] call [phone] to schedule or change your appointment.  If you had an XRay/CT/Ultrasound/MRI ordered the number is [phone] to schedule or change your radiology appointment.   Medical Concerns:  If you have urgent medical concerns please call [phone] at any time of the day.  If you have a medical emergency please call 911.            Follow-ups after your visit        Your next 10 appointments already scheduled     Jun 12, 2018  2:00 PM CDT   US PELVIC COMPLETE W TRANSVAGINAL with URUS1   [facility] [place], Ultrasound ([facility], [facility])    [address] [address] [phone]           Please bring a list of your medicines (including vitamins, minerals and over-the-counter drugs). Also, tell your doctor about any allergies you may have. Wear comfortable clothes and leave your valuables at home.  Adults: Drink six 8-ounce glasses of fluid one hour before your exam. Do NOT empty your bladder.  If you need to empty your bladder before your exam, try to release only a little bit of urine. Then, drink another 8oz glass of fluid.  Children: Children who are potty trained should drink at least 4 cups (32 oz) of liquid 45 minutes to one hour prior to the exam. The child s bladder must be full in order to achieve a diagnostic exam. If your child is very uncomfortable or has an urgent need to pee, please notify a technologist; they will try to find out how much longer the wait may be and provide instructions to help relieve the pressure. Occasionally it is medically necessary to insert a urinary catheter to fill the bladder.  Please call the Imaging Department at your exam site with any questions.            Henrique 15, 2018  2:00 PM CDT   PHYSICAL with [facility] ([facility])      [address] [place] [address],  [address]  [facility] [address]   [phone]              Future tests that were ordered for you today     Open Future Orders        Priority Expected Expires Ordered    US Pel W/Trans* Routine  2019            Who to contact     Please call your clinic at [phone] to:    Ask questions about your health    Make or cancel appointments    Discuss your medicines    Learn about your test results    Speak to your doctor            Additional Information About Your Visit        [employer] Information     [employer] is an electronic gateway that provides easy, online access to your medical records. With [employer], you can request a clinic appointment, read your test results, renew a prescription or communicate with your care team.     To sign up for [employer] visit the website at [email or web address]   You will be asked to enter the access code listed below, as well as some personal information. Please follow the directions to create your username and password.     Your access code is: [email or web address]  Expires: 2018  1:18 PM     Your access code will  in 90 days. If you need help or a new code, please contact your [facility] or call [phone] for assistance.        Care EveryWhere ID     This is your Care EveryWhere ID. This could be used by other organizations to access your [facility] medical records  [id number]        Your Vitals Were     Pulse Temperature Respirations Pulse Oximetry BMI (Body Mass Index)       68 98.1  F (36.7  C) (Oral) 16 100% 28.88 kg/m2        Blood Pressure from Last 3 Encounters:   18 131/84   18 120/83   04/10/18 125/85    Weight from Last 3 Encounters:   18 184 lb 6.4 oz (83.6 kg)   18 185 lb 12.8 oz (84.3 kg)   04/10/18 194 lb (88 kg)              We Performed the Following     HPV High Risk Types DNA Cervical     Pap imaged thin layer screen with HPV - recommended age 30 - 65 years  (select HPV order below)          Today's Medication Changes          These changes are accurate as of 6/11/18  3:35 PM.  If you have any questions, ask your nurse or doctor.               Start taking these medicines.        Dose/Directions    polyethylene glycol powder   Commonly known as:  MIRALAX   Used for:  Constipation, unspecified constipation type   Started by:  Joyce Arnold APRN CNP        Dose:  1 capful   Take 17 g (1 capful) by mouth daily   Quantity:  510 g   Refills:  1            Where to get your medicines      These medications were sent to [place] [address] [place] [address] [address]     Phone:  [phone]     polyethylene glycol powder                Primary Care Provider Office Phone # Fax #    Zoe Edouard [phone]       [address] [address]        Equal Access to Services     SUE GODOY : Hadii aad ku hadasho Sodonavon, waaxda luqadaha, qaybta kaalmada cody, shelly wei . So [facility] [phone].    ATENCIÓN: Si habla español, tiene a dalal disposición servicios gratuitos de asistencia lingüística. Toy al [phone].    We comply with applicable federal civil rights laws and [place] laws. We do not discriminate on the basis of race, color, national origin, age, disability, sex, sexual orientation, or gender identity.            Thank you!     Thank you for choosing \[facility]\"" [facility]  for your care. Our goal is always to provide you with excellent care. Hearing back from our patients is one way we can continue to improve our services. Please take a few minutes to complete the written survey that you may receive in the mail after your visit with us. Thank you!             Your Updated Medication List - Protect others around you: Learn how to safely use, store and throw away your medicines at www.disposemymeds.org.          This list is accurate as  of 6/11/18  3:35 PM.  Always use your most recent med list.                   Brand Name Dispense Instructions for use Diagnosis    cetirizine 10 MG tablet    zyrTEC    30 tablet    Take 1 tablet (10 mg) by mouth daily    Dysfunction of right eustachian tube       polyethylene glycol powder    MIRALAX    510 g    Take 17 g (1 capful) by mouth daily    Constipation, unspecified constipation type       TYLENOL PO

## 2018-06-12 ENCOUNTER — HOSPITAL ENCOUNTER (OUTPATIENT)
Dept: ULTRASOUND IMAGING | Facility: CLINIC | Age: 38
Discharge: HOME | End: 2018-06-12
Attending: NURSE PRACTITIONER | Admitting: NURSE PRACTITIONER
Payer: COMMERCIAL

## 2018-06-12 DIAGNOSIS — R10.30 LOWER ABDOMINAL PAIN: ICD-10-CM

## 2018-06-12 PROCEDURE — 76830 TRANSVAGINAL US NON-OB: CPT

## 2018-06-12 NOTE — PROCEDURES
[facility]  Procedure Note    Terrance Mcwilliams is a patient of Zoe Mem[facility] here for cryotherapy     Consent: . Risks, benefits and alternatives were discussed. Patient's questions were elicited and answered.     Preoperative Diagnosis: Common warts vs. Corn (probable)  Postoperative Diagnosis: same  Location:   Plantar aspect of left foot    Technique:   Lesion(s) pared down x 1 lesion(s) on plantar aspect of left foot  Liquid Nitrogen (cryotherapy) x 1 lesion(s) 3 cycles   Complications:  None  Tolerance: Pt tolerated procedure well and was in stable condition.     Follow up: Pt was instructed to expect the area to darken. Some dead skin may fall off. Instructed to pare lesion with file or pumice stone.    Follow up in 2-4 weeks.      Resident: Joyce Arnold  Faculty: Joyce Arnold, BELKIS CNP present for and supervised this entire procedure.

## 2018-06-13 LAB
COPATH REPORT: NORMAL
PAP: NORMAL

## 2018-06-15 ENCOUNTER — OFFICE VISIT (OUTPATIENT)
Dept: FAMILY MEDICINE | Facility: CLINIC | Age: 38
End: 2018-06-15
Payer: COMMERCIAL

## 2018-06-15 VITALS
HEIGHT: 67 IN | SYSTOLIC BLOOD PRESSURE: 119 MMHG | HEART RATE: 80 BPM | WEIGHT: 184.6 LBS | DIASTOLIC BLOOD PRESSURE: 80 MMHG | TEMPERATURE: 98.4 F | OXYGEN SATURATION: 97 % | BODY MASS INDEX: 28.97 KG/M2

## 2018-06-15 DIAGNOSIS — K59.01 SLOW TRANSIT CONSTIPATION: ICD-10-CM

## 2018-06-15 DIAGNOSIS — N91.1 SECONDARY AMENORRHEA: ICD-10-CM

## 2018-06-15 DIAGNOSIS — S09.90XA INJURY OF HEAD, INITIAL ENCOUNTER: ICD-10-CM

## 2018-06-15 DIAGNOSIS — B07.0 PLANTAR WARTS: ICD-10-CM

## 2018-06-15 DIAGNOSIS — Z00.00 ENCOUNTER FOR ROUTINE ADULT HEALTH EXAMINATION WITHOUT ABNORMAL FINDINGS: Primary | ICD-10-CM

## 2018-06-15 DIAGNOSIS — R10.32 ABDOMINAL PAIN, LEFT LOWER QUADRANT: ICD-10-CM

## 2018-06-15 LAB
% GRANULOCYTES: 46.3 %G (ref 40–75)
BACTERIA: NORMAL
BILIRUBIN UR: NEGATIVE
BLOOD UR: ABNORMAL
CASTS: NORMAL /LPF
CRYSTAL URINE: NORMAL /LPF
EPITHELIAL CELLS UR: NORMAL /LPF (ref 0–2)
FINAL DIAGNOSIS: NORMAL
FSH SERPL-ACNC: 78.3 IU/L
GLUCOSE URINE: NEGATIVE
GRANULOCYTES #: 2.2 K/UL (ref 1.6–8.3)
HCG UR QL: NEGATIVE
HCT VFR BLD AUTO: 40.5 % (ref 35–47)
HEMOGLOBIN: 12.2 G/DL (ref 11.7–15.7)
HPV HR 12 DNA CVX QL NAA+PROBE: NEGATIVE
HPV16 DNA SPEC QL NAA+PROBE: NEGATIVE
HPV18 DNA SPEC QL NAA+PROBE: NEGATIVE
KETONES UR QL: NEGATIVE
LEUKOCYTE ESTERASE UR: ABNORMAL
LH SERPL-ACNC: 26.5 IU/L
LIPASE SERPL-CCNC: 197 U/L (ref 73–393)
LYMPHOCYTES # BLD AUTO: 2.1 K/UL (ref 0.8–5.3)
LYMPHOCYTES NFR BLD AUTO: 45.4 %L (ref 20–48)
MCH RBC QN AUTO: 27.1 PG (ref 26.5–35)
MCHC RBC AUTO-ENTMCNC: 30.1 G/DL (ref 32–36)
MCV RBC AUTO: 90.1 FL (ref 78–100)
MID #: 0.4 K/UL (ref 0–2.2)
MID %: 8.3 %M (ref 0–20)
MUCOUS URINE: NORMAL LPF
NITRITE UR QL STRIP: NEGATIVE
PH UR STRIP: 7 [PH] (ref 5–7)
PLATELET # BLD AUTO: 154 K/UL (ref 150–450)
PROTEIN UR: NEGATIVE
RBC # BLD AUTO: 4.5 M/UL (ref 3.8–5.2)
RBC URINE: NORMAL /HPF
SP GR UR STRIP: 1.01
SPECIMEN DESCRIPTION: NORMAL
SPECIMEN SOURCE CVX/VAG CYTO: NORMAL
UROBILINOGEN UR STRIP-ACNC: ABNORMAL
WBC # BLD AUTO: 4.7 K/UL (ref 4–11)
WBC URINE: NORMAL /HPF

## 2018-06-15 NOTE — MR AVS SNAPSHOT
After Visit Summary   6/15/2018    Terrance Mcwilliams    MRN: [id number]           Patient Information     Date Of Birth          1980        Visit Information        Provider Department      6/15/2018 2:00 PM Zoe Edouard MD [facility]        Today's Diagnoses     Encounter for routine adult health examination without abnormal findings    -  1    Plantar warts        Secondary amenorrhea        Injury of head, initial encounter        Abdominal pain, left lower quadrant        Slow transit constipation          Care Instructions    1. Schedule appointment to discuss head injury symptoms.  2. Add Calcium to diet: eat spinach.  3. Urine and Blood labs today at [facility]  4. Take Mirilax once a day for month   5. Schedule an appointment with Dr. Edouard to discuss lab results.  Preventive Health Recommendations  Female Ages 26 - 39  Yearly exam:   See your health care provider every year in order to    Review health changes.     Discuss preventive care.      Review your medicines if you your doctor has prescribed any.    Until age 30: Get a Pap test every three years (more often if you have had an abnormal result).    After age 30: Talk to your doctor about whether you should have a Pap test every 3 years or have a Pap test with HPV screening every 5 years.   You do not need a Pap test if your uterus was removed (hysterectomy) and you have not had cancer.  You should be tested each year for STDs (sexually transmitted diseases), if you're at risk.   Talk to your provider about how often to have your cholesterol checked.  If you are at risk for diabetes, you should have a diabetes test (fasting glucose).  Shots: Get a flu shot each year. Get a tetanus shot every 10 years.   Nutrition:     Eat at least 5 servings of fruits and vegetables each day.    Eat whole-grain bread, whole-wheat pasta and brown rice instead of white grains and rice.    Talk to your provider about Calcium and Vitamin  "D.     Lifestyle    Exercise at least 150 minutes a week (30 minutes a day, 5 days of the week). This will help you control your weight and prevent disease.    Limit alcohol to one drink per day.    No smoking.     Wear sunscreen to prevent skin cancer.    See your dentist every six months for an exam and cleaning.            Follow-ups after your visit        Your next 10 appointments already scheduled     2018 10:00 AM CDT   Return Visit with [facility] ([facility])    [address],  [address]  [place] [address]   [phone]              Who to contact     Please call your clinic at [phone] to:    Ask questions about your health    Make or cancel appointments    Discuss your medicines    Learn about your test results    Speak to your doctor            Additional Information About Your Visit        MyChart Information     S[employer]Serve is an electronic gateway that provides easy, online access to your medical records. With SkuServe, you can request a clinic appointment, read your test results, renew a prescription or communicate with your care team.     To sign up for SkuServe visit the website at [email or web address]   You will be asked to enter the access code listed below, as well as some personal information. Please follow the directions to create your username and password.     Your access code is: [email or web address]  Expires: 2018  1:18 PM     Your access code will  in 90 days. If you need help or a new code, please contact your [facility] or call [phone] for assistance.        Care EveryWhere ID     This is your Care EveryWhere ID. This could be used by other organizations to access your [place] medical records  [id number]        Your Vitals Were     Pulse Temperature Height Pulse Oximetry BMI (Body Mass Index)       80 98.4  F (36.9  C) (Oral) 5' 7\" (170.2 cm) 97% 28.91 kg/m2        Blood " Pressure from Last 3 Encounters:   06/15/18 119/80   06/11/18 131/84   05/29/18 120/83    Weight from Last 3 Encounters:   06/15/18 184 lb 9.6 oz (83.7 kg)   06/11/18 184 lb 6.4 oz (83.6 kg)   05/29/18 185 lb 12.8 oz (84.3 kg)              We Performed the Following     CBC with Diff Plt ([facility])     Follicle stimulating hormone     HCG Qualitative Urine (UPT) ([facility])     Lipase     Lutropin     Urinalysis, Micro If (UA) ([facility])     Urine Microscopic (UA) ([facility])          Today's Medication Changes          These changes are accurate as of 6/15/18  4:21 PM.  If you have any questions, ask your nurse or doctor.               Stop taking these medicines if you haven't already. Please contact your care team if you have questions.     cetirizine 10 MG tablet   Commonly known as:  zyrTEC   Stopped by:  Zeo Edouard MD                    Primary Care Provider Office Phone # Fax #    Zoe Edouard [phone]       [address] [address]        Equal Access to Services     [facility]: Hadii billy burris Sodonavon, waaxda luqadaha, qaybta kaalmada ademarine, shelly wei . So [facility] [phone].    ATENCIÓN: Si habla español, tiene a dalal disposición servicios gratuitos de asistencia lingüística. Llame al [phone].    We comply with applicable federal civil rights laws and [place] laws. We do not discriminate on the basis of race, color, national origin, age, disability, sex, sexual orientation, or gender identity.            Thank you!     Thank you for choosing [facility]  for your care. Our goal is always to provide you with excellent care. Hearing back from our patients is one way we can continue to improve our services. Please take a few minutes to complete the written survey that you may receive in the mail after your visit with us. Thank you!             Your Updated Medication List - Protect others around you: Learn  how to safely use, store and throw away your medicines at www.disposemymeds.org.          This list is accurate as of 6/15/18  4:21 PM.  Always use your most recent med list.                   Brand Name Dispense Instructions for use Diagnosis    polyethylene glycol powder    MIRALAX    510 g    Take 17 g (1 capful) by mouth daily    Constipation, unspecified constipation type       TYLENOL PO

## 2018-06-15 NOTE — LETTER
June 18, 2018      Terrance Mcwilliams  [address]   [place] [address]        Dear Terrance,    Thank you for getting your care at [facility]. Please see below for your test results. Like we discussed, I look forward to seeing you to discuss these results.     Resulted Orders   HCG Qualitative Urine (UPT) ([facility])   Result Value Ref Range    HCG Qual Urine NEGATIVE Negative   CBC with Diff Plt ([facility])   Result Value Ref Range    WBC 4.7 4.0 - 11.0 K/uL    Lymphocytes # 2.1 0.8 - 5.3 K/uL    % Lymphocytes 45.4 20.0 - 48.0 %L    Mid # 0.4 0.0 - 2.2 K/uL    Mid % 8.3 0.0 - 20.0 %M    GRANULOCYTES # 2.2 1.6 - 8.3 K/uL    % Granulocytes 46.3 40.0 - 75.0 %G    RBC 4.50 3.80 - 5.20 M/uL    Hemoglobin 12.2 11.7 - 15.7 g/dL    Hematocrit 40.5 35.0 - 47.0 %    MCV 90.1 78.0 - 100.0 fL    MCH 27.1 26.5 - 35.0 pg    MCHC 30.1 (L) 32.0 - 36.0 g/dL    Platelets 154.0 150.0 - 450.0 K/uL   Urinalysis, Micro If (UA) ([facility])   Result Value Ref Range    Specific Gravity Urine 1.010 1.005 - 1.030    pH Urine 7.0 4.5 - 8.0    Leukocyte Esterase UR 2+ (A) NEGATIVE    Nitrite Urine Negative NEGATIVE    Protein UR Negative NEGATIVE    Glucose Urine Negative NEGATIVE    Ketones Urine Negative NEGATIVE    Urobilinogen mg/dL 0.2 E.U./dL 0.2 E.U./dL    Bilirubin UR Negative NEGATIVE    Blood UR 2+ (A) NEGATIVE   Lipase   Result Value Ref Range    Lipase 197 73 - 393 U/L   Follicle stimulating hormone   Result Value Ref Range    FSH 78.3 IU/L      Comment:      FSH Reference Range  Female: Follicular      2.5-10.2          Mid-cycle       3.4-33.4          Luteal          1.5-9.1          Postmenopausal  23.0-116.3     Lutropin   Result Value Ref Range    Lutropin 26.5 IU/L      Comment:      LH Reference Range  Female: Follicular      1.9-12.5          Mid-cycle       8.7-76.3          Luteal          0.5-16.9          Postmenopausal  15.9-54.0     Urine Microscopic (UA) ([facility])   Result Value Ref Range    WBC  Urine 10-25 <5 /hpf    RBC Urine 5-10 <5 /hpf    Epithelial Cells UR None 0 - 2 /lpf    Mucous Urine None NONE lpf    Casts Urine None NONE /lpf    Crystal Urine None NONE /lpf    Bacteria Wet Prep None None       Sincerely,    Zoe Edouard MD

## 2018-06-15 NOTE — PATIENT INSTRUCTIONS
1. Schedule appointment to discuss head injury symptoms.  2. Add Calcium to diet: eat spinach.  3. Urine and Blood labs today at [facility]  4. Take Mirilax once a day for month   5. Schedule an appointment with Dr. Edouard to discuss lab results.  Preventive Health Recommendations  Female Ages 26 - 39  Yearly exam:   See your health care provider every year in order to    Review health changes.     Discuss preventive care.      Review your medicines if you your doctor has prescribed any.    Until age 30: Get a Pap test every three years (more often if you have had an abnormal result).    After age 30: Talk to your doctor about whether you should have a Pap test every 3 years or have a Pap test with HPV screening every 5 years.   You do not need a Pap test if your uterus was removed (hysterectomy) and you have not had cancer.  You should be tested each year for STDs (sexually transmitted diseases), if you're at risk.   Talk to your provider about how often to have your cholesterol checked.  If you are at risk for diabetes, you should have a diabetes test (fasting glucose).  Shots: Get a flu shot each year. Get a tetanus shot every 10 years.   Nutrition:     Eat at least 5 servings of fruits and vegetables each day.    Eat whole-grain bread, whole-wheat pasta and brown rice instead of white grains and rice.    Talk to your provider about Calcium and Vitamin D.     Lifestyle    Exercise at least 150 minutes a week (30 minutes a day, 5 days of the week). This will help you control your weight and prevent disease.    Limit alcohol to one drink per day.    No smoking.     Wear sunscreen to prevent skin cancer.    See your dentist every six months for an exam and cleaning.

## 2018-06-15 NOTE — PROGRESS NOTES
Female Physical Note          HPI         Concerns today: stomach pain and irregular menstrual cycle  A Iraqi  was used for  this visit.     Plantar Warts  Warts are good. Just had warts treated  and  would like to have them retreated today, but not sure if we can.    Stomach Pain  Pt reports having severe stomach pain left side. Pain is alleviated by laying down and exacerbated by work. Reports feeling fatigue, nausea without emesis. No recent travel or recent international visitors.     Irregular Menstrual Cycle  Irregular periods, last period was two months ago. Cycle length is shorter than normal or longer than normal for the last year or less. Pt often checks if she is pregnant; checks 6 week for LMP and was negative.    Head Injury  Hx of head injury. Pt is sensitive to light and sound. Reports having dizziness, sweating, and fast breathing. Denies blood in stool, SOB, coughing, and CP.     FHx  Mom (lives in [facility]) has high blood pressure, high cholesterol, diabetes, and throat cancer. Pts mom does use tobacco.    Diet  Eats vegetables daily. Used to drink milk, but now it causes constipation. Tried almond milk, but does not like the taste.     Social Hx  Pt is  and sexually active. Pt does not use alcohol, drugs, or tobacco. Pt is employed; walks a lot at work.    Patient Active Problem List   Diagnosis     Language barrier, cultural differences     PPD positive, treated     Diminished ovarian reserve/per Dr. 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     Vitamin D deficiency     Plantar warts     Older than stated age ( )     Hyperopic astigmatism of both eyes     Presbyopia     Acute pain of left shoulder       Past Medical History:   Diagnosis Date     Kidney stone 10/08       Previous Medical Care   At [facility]     Family History   Problem Relation Age of Onset     Unknown/Adopted Mother      Hypertension Mother      Unknown/Adopted  Father       of sickness age 65     Glaucoma No family hx of      Macular Degeneration No family hx of      Breast Cancer No family hx of               Review of Systems:   POSITIVE soft stools.    Review of Systems:  CONSTITUTIONAL: NEGATIVE for fever, chills, change in weight  INTEGUMENTARY/SKIN: NEGATIVE for worrisome rashes, moles or lesions  EYES: NEGATIVE for vision changes or irritation  ENT/MOUTH: NEGATIVE for ear, mouth and throat problems  RESP: NEGATIVE for significant cough or SOB  BREAST: NEGATIVE for masses, tenderness or discharge  CV: NEGATIVE for chest pain, palpitations or peripheral edema  GI: NEGATIVE for nausea, abdominal pain, heartburn, or change in bowel habits  : NEGATIVE for frequency, dysuria, or hematuria  MUSCULOSKELETAL: NEGATIVE for significant arthralgias or myalgia  NEURO: NEGATIVE for weakness, dizziness or paresthesias  ENDOCRINE: NEGATIVE for temperature intolerance, skin/hair changes  HEME/ALLERGY: NEGATIVE for bleeding problems  PSYCHIATRIC: NEGATIVE for changes in mood or affect   Crying spells have improved.     See HPI for additional sx.    This document serves as a record of the services and decisions personally performed and made by Zoe Edouard MD. It was created on his/her behalf by Comfort Knight, a trained medical scribe. The creation of this document is based the provider's statements to the medical scribe.  Andrade Knight 2:25 PM, Ora 15, 2018             Social History     Social History     Social History     Marital status:      Spouse name: N/A     Number of children: 0     Years of education: N/A     Occupational History      [employer]     Social History Main Topics     Smoking status: Never Smoker     Smokeless tobacco: Never Used     Alcohol use No     Drug use: No     Sexual activity: Yes     Partners: Male      Comment: none     Other Topics Concern     Not on file     Social History Narrative    Lives with  and daughter. Works  "stocking shelves.     Mom lives in [facility], has throat cancer.                            Marital Status:   Who lives in your household? Spouse and children     Has anyone hurt you physically, for example by pushing, hitting, slapping or kicking you or forcing you to have sex? Denies  Do you feel threatened or controlled by a partner, ex-partner or anyone in your life? Denies    Sexual Health     Sexual concerns: No   STI History: Neg  Pregnancy History:   LMP No LMP recorded. Patient is not currently having periods (Reason: Irregular Periods).   Last Pap Smear Date:   Lab Results   Component Value Date    PAP NIL 2018    PAP NIL 12/10/2013    PAP NIL 2010     Abnormal Pap History: None    Recommended Screening       utd on mammo and pap       Physical Exam:     Vitals: /80  Pulse 80  Temp 98.4  F (36.9  C) (Oral)  Ht 5' 7\" (170.2 cm)  Wt 184 lb 9.6 oz (83.7 kg)  SpO2 97%  BMI 28.91 kg/m2  BMI= Body mass index is 28.91 kg/(m^2).     GENERAL: healthy, alert and no distress  EYES: Eyes grossly normal to inspection, extraocular movements - intact, and PERRL  HENT: ear canals- normal; TMs- normal; Nose- normal; Mouth- no ulcers, no lesions  NECK: no tenderness, no adenopathy, no asymmetry, no masses, no stiffness; thyroid- normal to palpation  RESP: lungs clear to auscultation - no rales, no rhonchi, no wheezes  CV: regular rates and rhythm, normal S1 S2, no S3 or S4 and no murmur, no click or rub -  ABDOMEN: soft, no  hepatosplenomegaly, no masses, normal bowel sounds. Mild Tenderness in LLQ, loop of bowel. No guarding  MS: extremities- no gross deformities noted, no edema. Plantar warts bottom of l foot greatly improved.  SKIN: no suspicious lesions, no rashes  NEURO: strength and tone- normal, sensory exam- grossly normal, mentation- intact, speech- normal, reflexes- symmetric  - normal ext gen depiliated female: cervix- normal, adnexae- normal; uterus- normal, no masses, no " discharge non-gravid uterus, non tender no cervical tender motionless adnexa small and non tender   PSYCH: Alert and oriented times 3; speech- coherent , normal rate and volume; able to articulate logical thoughts, affect- anxious      Assessment and Plan      Terrance was seen today for physical.    Diagnoses and all orders for this visit:    Encounter for routine adult health examination without abnormal findings  1. Health Care Maintenance: Normal Physical Exam  1. Routine follow up in one year.  2.Contraception: Details: none desired  Calcium as below    Plantar warts    Secondary amenorrhea  06/12 pelvic US was reviewed and discussed with pt: 2 small fibroids and normal ovaries.  -     HCG Qualitative Urine (UPT) ([facility])  -     Follicle stimulating hormone  -     Lutropin  Suspect early menopause given early fertiltiy hx and diminished ovarian reserve.      Injury of head, initial encounter  Will schedule separate appointment to further examination.    Abdominal pain, left lower quadrant  -     CBC with Diff Plt ([facility])  -     Urinalysis, Micro If (UA) ([facility])  -     Lipase  Suspect constipation as new report of soft stools is only since 6/12 when started miralax      AVS  1. Schedule appointment to discuss head injury symptoms.  2. Add Calcium to diet: eat spinach.  3. Urine and Blood labs today at [facility]  4. Take Mirilax once a day for month   5. Schedule an appointment with Dr. Edouard to discuss lab results.    Options for treatment and follow-up care were reviewed with the patient . Terrance Mcwilliams and/or guardian engaged in the decision making process and verbalized understanding of the options discussed and agreed with the final plan.  I spent 25 min face to face with the patient and >50% was spent counselling the patient about the above medical conditions, educating patient on their medical conditions, behavioral interventions and supports regarding abdominal pain, likely early menopause, and  constipation. This is in addition to CPE/rpevenative cafre      The information in this document, created by the medical scribe for me, accurately reflects the services I personally performed and the decisions made by me. I have reviewed and approved this document for accuracy prior to leaving the patient care area.  Zoe Edouard MD  2:25 PM, 06/15/18

## 2018-06-15 NOTE — NURSING NOTE
Due to patient being non-English speaking/uses sign language, an  was used for this visit. Only for face-to-face interpretation by an external agency, date and length of interpretation can be found on the scanned worksheet.     name: Rachel Cazares  Agency: [employer]  Language: Mauritian   Telephone number: [phone]  Type of interpretation: Face-to-face, spoken

## 2018-07-02 ENCOUNTER — RADIANT APPOINTMENT (OUTPATIENT)
Dept: GENERAL RADIOLOGY | Facility: CLINIC | Age: 38
End: 2018-07-02
Attending: FAMILY MEDICINE
Payer: COMMERCIAL

## 2018-07-02 ENCOUNTER — OFFICE VISIT (OUTPATIENT)
Dept: FAMILY MEDICINE | Facility: CLINIC | Age: 38
End: 2018-07-02
Payer: COMMERCIAL

## 2018-07-02 VITALS
OXYGEN SATURATION: 98 % | DIASTOLIC BLOOD PRESSURE: 85 MMHG | BODY MASS INDEX: 29.01 KG/M2 | SYSTOLIC BLOOD PRESSURE: 124 MMHG | TEMPERATURE: 97.9 F | WEIGHT: 185.2 LBS | HEART RATE: 73 BPM

## 2018-07-02 DIAGNOSIS — M22.2X2 PATELLOFEMORAL PAIN SYNDROME OF LEFT KNEE: ICD-10-CM

## 2018-07-02 DIAGNOSIS — R10.32 ABDOMINAL PAIN, LEFT LOWER QUADRANT: ICD-10-CM

## 2018-07-02 DIAGNOSIS — R10.32 ABDOMINAL PAIN, LEFT LOWER QUADRANT: Primary | ICD-10-CM

## 2018-07-02 DIAGNOSIS — N20.0 CALCULUS OF KIDNEY: ICD-10-CM

## 2018-07-02 DIAGNOSIS — B07.0 PLANTAR WARTS: ICD-10-CM

## 2018-07-02 DIAGNOSIS — E28.39 PREMATURE OVARIAN FAILURE: ICD-10-CM

## 2018-07-02 PROBLEM — M25.512 ACUTE PAIN OF LEFT SHOULDER: Status: RESOLVED | Noted: 2018-02-22 | Resolved: 2018-07-02

## 2018-07-02 RX ORDER — TAMSULOSIN HYDROCHLORIDE 0.4 MG/1
0.4 CAPSULE ORAL DAILY
Qty: 30 CAPSULE | Refills: 0 | Status: SHIPPED | OUTPATIENT
Start: 2018-07-02 | End: 2018-07-30

## 2018-07-02 RX ORDER — LEVONORGESTREL/ETHIN.ESTRADIOL 0.1-0.02MG
1 TABLET ORAL DAILY
Qty: 84 TABLET | Refills: 3 | Status: SHIPPED | OUTPATIENT
Start: 2018-07-02

## 2018-07-02 NOTE — PROGRESS NOTES
HPI:       Terrance Mcwilliams is a 38 year old who presents for the following  Patient presents with:  Results: lab  RECHECK: wart on left foot    Wart  Pt received cryotherapy treatment to L foot for plantar wart on 6/11/18.     Lab results  Pt had lab results on 6/15 for amenorrhea. Pt is aware of early age menopause, which is the same as her sister. Denies having sx related to low estrogen levels such as thinning of vaginal area.  Sometimes has pain with intercourse. Denies bleeding with intercourse. Reports pain during intercourse is not severe enough to be on medication.      Abdominal Pain  Pt c/o L lower abdominal pain. She reports pain is so bad she had to stop working and sit down. Resting and sleeping helps the pain. Eating and walking exacerbates the pain. Miralax . Although drinking scented tea softens her stool and helps move her bowels. Drinking tea every other day. Having a BM sometimes helps the pain. Pt is worried about something dangerous, such as a tumor. Pelvic US revewied, no pelvic mass, minor fibroids Worried about colon cancer, although no Fhx.  Reports past hx of kidney stone in 2008.     Knee pain  Pt reports of L swollen knee, but complaining of bilateral knee pain. Worsens upon sitting or driving (when knee is bent). Knee pain present since 2015. Denies injury, redness or bruising. Pt is interested in PT.     A [employer]  was used for  this visit.      Problem, Medication and Allergy Lists were reviewed and are current.  Patient is an established patient of this clinic.         Review of Systems:   Review of Systems     Denies nipple discharge.       This document serves as a record of the services and decisions personally performed and made by Zoe Edouard MD. It was created on his/her behalf by Tracey Healy, a trained medical scribe. The creation of this document is based the provider's statements to the medical scribe.  Andrade Healy 9:59 AM, July 2, 2018          Physical Exam:     Patient Vitals for the past 24 hrs:   BP Temp Temp src Pulse SpO2 Weight   07/02/18 1010 124/85 97.9  F (36.6  C) Oral 73 98 % 185 lb 3.2 oz (84 kg)     Body mass index is 29.01 kg/(m^2).  Vitals were reviewed and were normal     Physical Exam     BMI= Body mass index is 29.01 kg/(m^2).   GENERAL: healthy, alert and no distress  MS:   L Knee:  Medial joint line tenderness. Worse in the soft tissue of medial knee. Ext is full, flexion is full.  Pelvis:   Ant pelvic tilt. L hip down 1 inch. Hip drop with single leg standing.   SKIN:  L Foot:   PSYCH: Alert and oriented times 3; speech- coherent , normal rate and volume; able to articulate logical thoughts, able to abstract reason, no tangential thoughts, no hallucinations or delusions, affect- normal        Results:     Cryotherapy Note    Terrance is a 38 year old patient who presents for treatment of a lesion on her L foot.  Pre-procedure diagnosis: plantar wart  Post-procedure diagnosis: Unchanged  Consent: Verbal, explained risks (scarring, recurrence, pain) and benefits of treatment (resolution of lesion)  and non treatment with patient and he expressed understanding and a desire to continue treatment.    Liquid nitrogen was used directly on the lesion(s) on L foot.  A total of 1 lesions were treated with 3 freeze thaw cycles for 30 sec freeze time.     Patient tolerated procedure well.  Advised patient to return in 3 weeks for recheck/retreatment if needed.    Zoe Edouard MD     Results from the last 24 hoursNo results found for this or any previous visit (from the past 24 hour(s)).  Assessment and Plan     Terrance was seen today for results and recheck.    Diagnoses and all orders for this visit:    Abdominal pain, left lower quadrant  -     X-RAY ABDOMEN 1 VW - UPRIGHT; Future    1. Senna tea every other day  2. Miralax morning and night.   3. Take one tab of Flomax daily for 14 days.  4. Try to save stone with a urinary catch      Plantar  warts    Return in 3 weeks to see if we need to repeat treatment.     Patellofemoral pain syndrome of left knee  -     [facility], PT, HAND AND CHIROPRACTIC REFERRAL - [facility]    Premature ovarian failure  -     levonorgestrel-ethinyl estradiol (AVIANE,ALESSE,LESSINA) 0.1-20 MG-MCG per tablet; Take 1 tablet by mouth daily    Calculus of kidney  -     tamsulosin (FLOMAX) 0.4 MG capsule; Take 1 capsule (0.4 mg) by mouth daily     Take one tab of Flomax daily for 14 days.    Try to save stone with a urinary catch      AVS  Knee Pain:  1. Call PT to make an appointment.   2. Apply ice 20 min    Abdominal Pain  1. Senna tea every other day  2. Miralax morning and night.   3. Take one tab of Flomax daily for 14 days.  4. Try to save stone with a urinary catch      Low estrogen/early menopause  1. Daily hormone pill  2. In the last week of the month is when you will have a period     Wart  1. Return in 3 weeks to see if we need to repeat treatment.     Return in 2 weeks to see if stone has passed       Have not ruled out urinary source of bleeding and LLQ abdominal pain. If x ray is unremarkable, then I would consider consulting GYN for firboirds or GI. It does seem to be GI because she gets relief with passing stools and gas. Pt desires colonoscopy for evaluation.     There are no discontinued medications.  Options for treatment and follow-up care were reviewed with the patient. Terrance Mcwilliams  engaged in the decision making process and verbalized understanding of the options discussed and agreed with the final plan.    The information in this document, created by the medical scribe for me, accurately reflects the services I personally performed and the decisions made by me. I have reviewed and approved this document for accuracy prior to leaving the patient care area.  Zoe Edouard MD  9:59 AM, 07/02/18  I spent 45 min face to face with the patient and >50% was spent counselling the patient about the above medical conditions,  educating patient on their medical conditions, behavioral interventions and supports. Spent 4 min performing procedure and the remainder on counseling  For stones, pain mgmt and periods.  Zoe Edouard MD

## 2018-07-02 NOTE — PATIENT INSTRUCTIONS
Knee Pain:  1. Call PT to make an appointment.   2. Apply ice 20 min    Abdominal Pain  1. Senna tea every other day  2. Miralax morning and night.   3. Take one tab of Flomax daily for 14 days.  4. Try to save stone with a urinary catch      Low estrogen/early menopause  1. Daily hormone pill  2. In the last week of the month is when you will have a period     Wart  1. Return in 3 weeks to see if we need to repeat treatment.     Return in 2 weeks to see if stone has passed

## 2018-07-02 NOTE — MR AVS SNAPSHOT
After Visit Summary   7/2/2018    Terrance Mcwilliams    MRN: [id number]           Patient Information     Date Of Birth          1980        Visit Information        Provider Department      7/2/2018 10:00 AM Zoe Edouard MD [facility]        Today's Diagnoses     Abdominal pain, left lower quadrant    -  1    Plantar warts        Patellofemoral pain syndrome of left knee        Premature ovarian failure        Calculus of kidney          Care Instructions    Knee Pain:  1. Call PT to make an appointment.   2. Apply ice 20 min    Abdominal Pain  1. Senna tea every other day  2. Miralax morning and night.   3. Take one tab of Flomax daily for 14 days.  4. Try to save stone with a urinary catch      Low estrogen/early menopause  1. Daily hormone pill  2. In the last week of the month is when you will have a period     Wart  1. Return in 3 weeks to see if we need to repeat treatment.     Return in 2 weeks to see if stone has passed           Follow-ups after your visit        Additional Services     [facility], PT, HAND AND CHIROPRACTIC REFERRAL - [facility]       **This order will print in the [facility] Scheduling Office**    Physical Therapy, Hand Therapy and Chiropractic Care are available through:    *[facility]  *[facility]  *[facility]    Call one number to schedule at any of the above locations: [phone].    Your provider has referred you to: [address]   As Indicated: for knee pain suspect patellofemoral    Indication/Reason for Referral: Knee Pain  Onset of Illness: 2015  Therapy Orders: Evaluate and Treat  Special Programs: None and as requested by PT  Special Request: to see [facility] - worked with them before on shoulder w/ great success  Sammarinese speaking requires interpertrosa [place]      Additional Comments for the Therapist or Chiropractor:     Please be aware that coverage of these services is subject to the  terms and limitations of your health insurance plan.  Call member services at your health plan with any benefit or coverage questions.      Please bring the following to your appointment:    *Your personal calendar for scheduling future appointments  *Comfortable clothing                  Who to contact     Please call your clinic at [phone] to:    Ask questions about your health    Make or cancel appointments    Discuss your medicines    Learn about your test results    Speak to your doctor            Additional Information About Your Visit        [employer] Information     Webflakes is an electronic gateway that provides easy, online access to your medical records. With Webflakes, you can request a clinic appointment, read your test results, renew a prescription or communicate with your care team.     To sign up for Webflakes visit the website at [email or web address]   You will be asked to enter the access code listed below, as well as some personal information. Please follow the directions to create your username and password.     Your access code is: [email or web address]  Expires: 2018  1:18 PM     Your access code will  in 90 days. If you need help or a new code, please contact your [facility] or call [phone] for assistance.        Care EveryWhere ID     This is your Care EveryWhere ID. This could be used by other organizations to access your [place] medical records  [id number]        Your Vitals Were     Pulse Temperature Pulse Oximetry BMI (Body Mass Index)          73 97.9  F (36.6  C) (Oral) 98% 29.01 kg/m2         Blood Pressure from Last 3 Encounters:   18 124/85   06/15/18 119/80   18 131/84    Weight from Last 3 Encounters:   18 185 lb 3.2 oz (84 kg)   06/15/18 184 lb 9.6 oz (83.7 kg)   18 184 lb 6.4 oz (83.6 kg)              We Performed the Following     [facility], PT, HAND AND CHIROPRACTIC REFERRAL - [facility]          Today's Medication Changes           These changes are accurate as of 7/2/18 11:40 AM.  If you have any questions, ask your nurse or doctor.               Start taking these medicines.        Dose/Directions    levonorgestrel-ethinyl estradiol 0.1-20 MG-MCG per tablet   Commonly known as:  AVIANE,ALESSE,LESSINA   Used for:  Premature ovarian failure   Started by:  Zoe Edouard MD        Dose:  1 tablet   Take 1 tablet by mouth daily   Quantity:  84 tablet   Refills:  3       tamsulosin 0.4 MG capsule   Commonly known as:  FLOMAX   Used for:  Calculus of kidney   Started by:  Zoe Edouard MD        Dose:  0.4 mg   Take 1 capsule (0.4 mg) by mouth daily   Quantity:  30 capsule   Refills:  0            Where to get your medicines      These medications were sent to [facility] [address] [facility]  [address] [facility] [address]     Phone:  [phone]     levonorgestrel-ethinyl estradiol 0.1-20 MG-MCG per tablet    tamsulosin 0.4 MG capsule                Primary Care Provider Office Phone # Fax #    Zoe Edouard [phone]       [address] [facility] [address]        Equal Access to Services     SUE GODOY : Hadii billy berman hadasho Soomaali, waaxda luqadaha, qaybta kaalmada adeegyada, shelly contreras haysharda harris. So [facility] [phone].    ATENCIÓN: Si habla español, tiene a dalal disposición servicios gratuitos de asistencia lingüística. Laquitaame al [phone].    We comply with applicable federal civil rights laws and [place] laws. We do not discriminate on the basis of race, color, national origin, age, disability, sex, sexual orientation, or gender identity.            Thank you!     Thank you for choosing [facility]  for your care. Our goal is always to provide you with excellent care. Hearing back from our patients is one way we can continue to improve our services. Please take a few minutes to complete the written survey that you may receive in  the mail after your visit with us. Thank you!             Your Updated Medication List - Protect others around you: Learn how to safely use, store and throw away your medicines at www.disposemymeds.org.          This list is accurate as of 7/2/18 11:40 AM.  Always use your most recent med list.                   Brand Name Dispense Instructions for use Diagnosis    levonorgestrel-ethinyl estradiol 0.1-20 MG-MCG per tablet    AVIANE,ALESSE,LESSINA    84 tablet    Take 1 tablet by mouth daily    Premature ovarian failure       polyethylene glycol powder    MIRALAX    510 g    Take 17 g (1 capful) by mouth daily    Constipation, unspecified constipation type       tamsulosin 0.4 MG capsule    FLOMAX    30 capsule    Take 1 capsule (0.4 mg) by mouth daily    Calculus of kidney       TYLENOL PO

## 2018-07-24 ENCOUNTER — OFFICE VISIT (OUTPATIENT)
Dept: FAMILY MEDICINE | Facility: CLINIC | Age: 38
End: 2018-07-24
Payer: COMMERCIAL

## 2018-07-24 VITALS
RESPIRATION RATE: 18 BRPM | OXYGEN SATURATION: 100 % | HEART RATE: 74 BPM | TEMPERATURE: 98.6 F | BODY MASS INDEX: 28.85 KG/M2 | DIASTOLIC BLOOD PRESSURE: 77 MMHG | SYSTOLIC BLOOD PRESSURE: 112 MMHG | WEIGHT: 184.2 LBS

## 2018-07-24 DIAGNOSIS — R10.11 RUQ ABDOMINAL PAIN: ICD-10-CM

## 2018-07-24 DIAGNOSIS — R10.32 LLQ ABDOMINAL PAIN: Primary | ICD-10-CM

## 2018-07-24 LAB
ALBUMIN SERPL-MCNC: 4.2 MG/DL (ref 3.8–5)
ALP SERPL-CCNC: 53.3 U/L (ref 31.7–110.5)
ALT SERPL-CCNC: 15 U/L (ref 0–45)
AST SERPL-CCNC: 13.4 U/L (ref 0–45)
BILIRUB SERPL-MCNC: 0.7 MG/DL (ref 0.2–1.3)
BUN SERPL-MCNC: 7.1 MG/DL (ref 7–19)
CALCIUM SERPL-MCNC: 9 MG/DL (ref 8.5–10.1)
CHLORIDE SERPLBLD-SCNC: 103.6 MMOL/L (ref 98–110)
CO2 SERPL-SCNC: 25.2 MMOL/L (ref 20–32)
CREAT SERPL-MCNC: 0.5 MG/DL (ref 0.5–1)
CRP SERPL-MCNC: 3 MG/L (ref 0–8)
GFR SERPL CREATININE-BSD FRML MDRD: >90 ML/MIN/1.7 M2
GLUCOSE SERPL-MCNC: 88.7 MG'DL (ref 70–99)
POTASSIUM SERPL-SCNC: 3.7 MMOL/DL (ref 3.3–4.5)
PROT SERPL-MCNC: 6.8 G/DL (ref 6.8–8.8)
SODIUM SERPL-SCNC: 135.3 MMOL/L (ref 132.6–141.4)

## 2018-07-24 NOTE — PATIENT INSTRUCTIONS
1. I will order a CT scan with contrast of the upper right quadrant.  2. Check labs again today at [facility].  3. Use 650mg tylenol or 600mg ibuprofen for the pain.  4. Follow-up with me as soon as CT scan is completed.

## 2018-07-24 NOTE — NURSING NOTE
Due to patient being non-English speaking/uses sign language, an  was used for this visit. Only for face-to-face interpretation by an external agency, date and length of interpretation can be found on the scanned worksheet.     name: Tran Jaeger  Agency: [employer]  Language: Venezuelan   Telephone number: [phone]  Type of interpretation: Face-to-face, spoken

## 2018-07-24 NOTE — PROGRESS NOTES
HPI       Terrance Mcwilliams is a 38 year old  who presents for   Chief Complaint   Patient presents with     Abdominal Pain     Increased pain following meals during bending moving,7/10 tender to touch      RECHECK     Feet concerns      Abdominal Pain  Reports still having the same abd pain since 4 months ago, and it's not getting better. Has been using the kidney stones medication as prescribed, but pain and sx is not alleviated. Underwear touching skin on the left side will bother pt. Walking, bending, and eating exacerbate pain. Left lower quadrant/ sciatic pain. Pain is now radiating to the right. Occassionally feels weakness. When she has pain, she cannot move she has to stop what she's doing and work on breathing. When pain comes she feels a faster heart rate, HA, and weakness. Episodes of pain last for 3-5 minutes. Will take Tylenol for pain relief, it helps in the moment. Pain returns after 3 hours. Pain waxes and wanes throughout the day, but pain is typically in the morning. The amount of episodes she has in a day depends on how much activity she does daily, or what she eats. Having BMs daily, stools are soft. Uses stool softeners. Denies blood in stools.     A [employer]  was used for  this visit.    +++++++    Problem, Medication and Allergy Lists were reviewed and updated if needed..    Patient is an established patient of this clinic..         Review of Systems:   Review of Systems     NEGATIVE for: weight loss, fever, sweats, edema in lower extremities, and nausea.   POSITIVE for decrease in appetite.     See HPI for additional sx.    This document serves as a record of the services and decisions personally performed and made by Zoe Edouard MD. It was created on his/her behalf by Comfort Knight, a trained medical scribe. The creation of this document is based the provider's statements to the medical scribe.  Andrade Knight 10:17 AM, July 24, 2018       Physical Exam:     Vitals:     07/24/18 1015   BP: 112/77   Pulse: 74   Resp: 18   Temp: 98.6  F (37  C)   TempSrc: Oral   SpO2: 100%   Weight: 184 lb 3.2 oz (83.6 kg)     Body mass index is 28.85 kg/(m^2).  Vitals were reviewed and were normal     Physical Exam    BMI= Body mass index is 28.85 kg/(m^2).     GENERAL: healthy, alert and no distress  RESP: lungs clear to auscultation - no rales, no rhonchi, no wheezes  CV: regular rates and rhythm, normal S1 S2, no S3 or S4 and no murmur, no click or rub -  ABDOMEN: soft, no  hepatosplenomegaly, normal bowel sounds. Spleen tip is palpable 2 fingerbreadth's below left costal margin. No rebound or guarding. RLQ: tenderness, negative for Aleman's sign, however pain in RLQ with palpation of RUQ.   RUQ: tenderness.  MS: extremities- no gross deformities noted, no edema. Left foot with a thickening over the area treated with cryo over 4th MTP. Pealed it back and there is no remaining wart virus underneath.  PSYCH: Alert and oriented times 3; speech- coherent , normal rate and volume. Affect- normal      Results:   Results from last visit:  Office Visit on 06/15/2018   Component Date Value Ref Range Status     HCG Qual Urine 06/15/2018 NEGATIVE  Negative Final     WBC 06/15/2018 4.7  4.0 - 11.0 K/uL Final     Lymphocytes # 06/15/2018 2.1  0.8 - 5.3 K/uL Final     % Lymphocytes 06/15/2018 45.4  20.0 - 48.0 %L Final     Mid # 06/15/2018 0.4  0.0 - 2.2 K/uL Final     Mid % 06/15/2018 8.3  0.0 - 20.0 %M Final     GRANULOCYTES # 06/15/2018 2.2  1.6 - 8.3 K/uL Final     % Granulocytes 06/15/2018 46.3  40.0 - 75.0 %G Final     RBC 06/15/2018 4.50  3.80 - 5.20 M/uL Final     Hemoglobin 06/15/2018 12.2  11.7 - 15.7 g/dL Final     Hematocrit 06/15/2018 40.5  35.0 - 47.0 % Final     MCV 06/15/2018 90.1  78.0 - 100.0 fL Final     MCH 06/15/2018 27.1  26.5 - 35.0 pg Final     MCHC 06/15/2018 30.1* 32.0 - 36.0 g/dL Final     Platelets 06/15/2018 154.0  150.0 - 450.0 K/uL Final     Specific Gravity Urine 06/15/2018  1.010  1.005 - 1.030 Final     pH Urine 06/15/2018 7.0  4.5 - 8.0 Final     Leukocyte Esterase UR 06/15/2018 2+* NEGATIVE Final     Nitrite Urine 06/15/2018 Negative  NEGATIVE Final     Protein UR 06/15/2018 Negative  NEGATIVE Final     Glucose Urine 06/15/2018 Negative  NEGATIVE Final     Ketones Urine 06/15/2018 Negative  NEGATIVE Final     Urobilinogen mg/dL 06/15/2018 0.2 E.U./dL  0.2 E.U./dL Final     Bilirubin UR 06/15/2018 Negative  NEGATIVE Final     Blood UR 06/15/2018 2+* NEGATIVE Final     Lipase 06/15/2018 197  73 - 393 U/L Final     FSH 06/15/2018 78.3  IU/L Final    Comment: FSH Reference Range  Female: Follicular      2.5-10.2          Mid-cycle       3.4-33.4          Luteal          1.5-9.1          Postmenopausal  23.0-116.3       Lutropin 06/15/2018 26.5  IU/L Final    Comment: LH Reference Range  Female: Follicular      1.9-12.5          Mid-cycle       8.7-76.3          Luteal          0.5-16.9          Postmenopausal  15.9-54.0       WBC Urine 06/15/2018 10-25  <5 /hpf Final     RBC Urine 06/15/2018 5-10  <5 /hpf Final     Epithelial Cells UR 06/15/2018 None  0 - 2 /lpf Final     Mucous Urine 06/15/2018 None  NONE lpf Final     Casts Urine 06/15/2018 None  NONE /lpf Final     Crystal Urine 06/15/2018 None  NONE /lpf Final     Bacteria Wet Prep 06/15/2018 None  None Final       Assessment and Plan   Terrance was seen today for abdominal pain and recheck.    Diagnoses and all orders for this visit:    LLQ abdominal pain  -     CT Abdomen Pelvis w/o & w Contrast; Future  -     Comprehensive Metabolic Panel ([facility])  -     CBC with Diff Plt ([facility]); Future  -     CRP inflammation    RUQ abdominal pain  -     RUQ US Abdomen Limited  - right upper quadrant; Future        - Differential is still broad. Pt has renal stone, which is unlikely to contribute to this colicky pain given location and response to Flomax. Not improved with tx of constipation either. Has life limiting abd pain yet is not pregnant.  Spleen tip was palpable in today's examination tip, and RLQ pain indicates it's time for CT to rule out diverticulitis, hypersplenism, and TB. We will do a RUQ US to rule out gallbladder pathology as well. Hx of latent TB that was treated in 2009 with 4 months of INH by pt report. INH was documented treated from 4/2001 to 06/2001.Hx of ovarian cyst in 2008 as well.    AVS    1. I will order a CT scan with contrast of the upper right quadrant.  2. Check labs again today at [facility].  3. Use 650mg tylenol or 600mg ibuprofen for the pain.  4. Follow-up with me as soon as CT scan is completed.    There are no discontinued medications.    Options for treatment and follow-up care were reviewed with the patient. Terrance Mcwilliams  engaged in the decision making process and verbalized understanding of the options discussed and agreed with the final plan.    The information in this document, created by the medical scribe for me, accurately reflects the services I personally performed and the decisions made by me. I have reviewed and approved this document for accuracy prior to leaving the patient care area.    Zoe Edouard MD  10:17 AM, 07/24/18

## 2018-07-24 NOTE — MR AVS SNAPSHOT
After Visit Summary   7/24/2018    Terrance Mcwilliams    MRN: [id number]           Patient Information     Date Of Birth          1980        Visit Information        Provider Department      7/24/2018 10:20 AM Zoe Edouard MD [facility]        Today's Diagnoses     LLQ abdominal pain    -  1    RUQ abdominal pain          Care Instructions    1. I will order a CT scan with contrast of the upper right quadrant.  2. Check labs again today at [facility].  3. Use 650mg tylenol or 600mg ibuprofen for the pain.  4. Follow-up with me in           Follow-ups after your visit        Your next 10 appointments already scheduled     Jul 25, 2018 10:00 AM CDT   US ABDOMEN LIMITED with URUS2   [facility], Ultrasound ([facility])    [address] [place] [address] [phone]           Please bring a list of your medicines (including vitamins, minerals and over-the-counter drugs). Also, tell your doctor about any allergies you may have. Wear comfortable clothes and leave your valuables at home.  Adults: No eating or drinking for 8 hours before the exam. You may take medicine with a small sip of water.  Children: - Infants, breast-fed: may have breast milk up to 2 hours before exam. - Infants, formula: may have bottle until 4 hours before exam. - Children 1-5 years: No food or drink for 4 hours before exam. - Children 6 -12 years: No food or drink for 6 hours before exam. - Children over 12 years: No food or drink for 8 hours before exam. - J Tube Fed: No need to stop feedings.  Please call the Imaging Department at your exam site with any questions.            Jul 25, 2018  1:30 PM CDT   CT ABDOMEN PELVIS W/O & W CONTRAST with URCT1   [facility]    Pending sale to [facility] [address] [phone]           Please bring any scans or X-rays  taken at other hospitals, if similar tests were done. Also bring a list of your medicines, including vitamins, minerals and over-the-counter drugs. It is safest to leave personal items at home.  Be sure to tell your doctor:   If you have any allergies.   If there s any chance you are pregnant.   If you are breastfeeding.  How to prepare:   Do not eat or drink for 2 hours before your exam. If you need to take medicine, you may take it with small sips of water. (We may ask you to take liquid medicine as well.)   Please wear loose clothing, such as a sweat suit or jogging clothes. Avoid snaps, zippers and other metal. We may ask you to undress and put on a hospital gown.  Please arrive 30 minutes early for your CT. Once in the department you might be asked to drink water 15-20 minutes prior to your exam.  If indicated you may be asked to drink an oral contrast in advance of your CT.  If this is the case, the imaging team will let you know or be in contact with you prior to your appointment  Patients over 70 or patients with diabetes or kidney problems:   If you haven t had a blood test (creatinine test) within the last 30 days, the Cardiologist/Radiologist may require you to get this test prior to your exam.  If you have diabetes:   Continue to take your metformin medication on the day of your exam  If you have any questions, please call the Imaging Department where you will have your exam.              Future tests that were ordered for you today     Open Future Orders        Priority Expected Expires Ordered    US Abdomen Limited Routine  7/24/2019 7/24/2018    CBC with Diff Plt ([facility]DA[facility]) Routine  7/24/2019 7/24/2018    CT Abdomen Pelvis w/o & w Contrast Routine  7/24/2019 7/24/2018            Who to contact     Please call your clinic at [phone] to:    Ask questions about your health    Make or cancel appointments    Discuss your medicines    Learn about your test results    Speak to your doctor             Additional Information About Your Visit        NovaShunt Information     NovaShunt is an electronic gateway that provides easy, online access to your medical records. With NovaShunt, you can request a clinic appointment, read your test results, renew a prescription or communicate with your care team.     To sign up for Nova[employer]unt visit the website at [email or web address]   You will be asked to enter the access code listed below, as well as some personal information. Please follow the directions to create your username and password.     Your access code is: [email or web address]  Expires: 2018  1:18 PM     Your access code will  in 90 days. If you need help or a new code, please contact your [facility] or call [phone] for assistance.        Care EveryWhere ID     This is your Care EveryWhere ID. This could be used by other organizations to access your [facility] medical records  [id number]        Your Vitals Were     Pulse Temperature Respirations Pulse Oximetry Breastfeeding? BMI (Body Mass Index)    74 98.6  F (37  C) (Oral) 18 100% No 28.85 kg/m2       Blood Pressure from Last 3 Encounters:   18 112/77   18 124/85   06/15/18 119/80    Weight from Last 3 Encounters:   18 184 lb 3.2 oz (83.6 kg)   18 185 lb 3.2 oz (84 kg)   06/15/18 184 lb 9.6 oz (83.7 kg)              We Performed the Following     Comprehensive Metabolic Panel (Lab[facility])     CRP inflammation        Primary Care Provider Office Phone # Fax #    Zoe Edouard [phone]        [address] [address]        Equal Access to Services     SUE GODOY : Hadii billy Ann, sha aguilar, shelly sterling. So [facility] [phone].    ATENCIÓN: Si habla español, tiene a dalal disposición servicios gratuitos de asistencia lingüística. Llame al [phone].    We comply with applicable federal civil rights  laws and [place] laws. We do not discriminate on the basis of race, color, national origin, age, disability, sex, sexual orientation, or gender identity.            Thank you!     Thank you for choosing [facility]  for your care. Our goal is always to provide you with excellent care. Hearing back from our patients is one way we can continue to improve our services. Please take a few minutes to complete the written survey that you may receive in the mail after your visit with us. Thank you!             Your Updated Medication List - Protect others around you: Learn how to safely use, store and throw away your medicines at www.disposemymeds.org.          This list is accurate as of 7/24/18 11:03 AM.  Always use your most recent med list.                   Brand Name Dispense Instructions for use Diagnosis    levonorgestrel-ethinyl estradiol 0.1-20 MG-MCG per tablet    AVIANE,ALESSE,LESSINA    84 tablet    Take 1 tablet by mouth daily    Premature ovarian failure       polyethylene glycol powder    MIRALAX    510 g    Take 17 g (1 capful) by mouth daily    Constipation, unspecified constipation type       tamsulosin 0.4 MG capsule    FLOMAX    30 capsule    Take 1 capsule (0.4 mg) by mouth daily    Calculus of kidney       TYLENOL PO

## 2018-07-25 ENCOUNTER — APPOINTMENT (OUTPATIENT)
Dept: CT IMAGING | Facility: CLINIC | Age: 38
End: 2018-07-25
Attending: EMERGENCY MEDICINE
Payer: COMMERCIAL

## 2018-07-25 ENCOUNTER — HOSPITAL ENCOUNTER (OUTPATIENT)
Dept: ULTRASOUND IMAGING | Facility: CLINIC | Age: 38
Discharge: HOME | End: 2018-07-25
Attending: FAMILY MEDICINE | Admitting: FAMILY MEDICINE
Payer: COMMERCIAL

## 2018-07-25 ENCOUNTER — HOSPITAL ENCOUNTER (EMERGENCY)
Facility: CLINIC | Age: 38
Discharge: HOME | End: 2018-07-25
Attending: EMERGENCY MEDICINE | Admitting: EMERGENCY MEDICINE
Payer: COMMERCIAL

## 2018-07-25 VITALS
SYSTOLIC BLOOD PRESSURE: 121 MMHG | HEART RATE: 64 BPM | RESPIRATION RATE: 16 BRPM | OXYGEN SATURATION: 100 % | TEMPERATURE: 98.4 F | DIASTOLIC BLOOD PRESSURE: 85 MMHG

## 2018-07-25 DIAGNOSIS — R82.90 ABNORMAL FINDING ON URINALYSIS: ICD-10-CM

## 2018-07-25 DIAGNOSIS — G89.29 CHRONIC ABDOMINAL PAIN: ICD-10-CM

## 2018-07-25 DIAGNOSIS — R10.9 CHRONIC ABDOMINAL PAIN: ICD-10-CM

## 2018-07-25 DIAGNOSIS — R10.11 RUQ ABDOMINAL PAIN: ICD-10-CM

## 2018-07-25 LAB
ALBUMIN SERPL-MCNC: 3.5 G/DL (ref 3.4–5)
ALBUMIN UR-MCNC: NEGATIVE MG/DL
ALP SERPL-CCNC: 62 U/L (ref 40–150)
ALT SERPL W P-5'-P-CCNC: 19 U/L (ref 0–50)
ANION GAP SERPL CALCULATED.3IONS-SCNC: 8 MMOL/L (ref 3–14)
APPEARANCE UR: CLEAR
AST SERPL W P-5'-P-CCNC: 16 U/L (ref 0–45)
BACTERIA #/AREA URNS HPF: ABNORMAL /HPF
BASOPHILS # BLD AUTO: 0 10E9/L (ref 0–0.2)
BASOPHILS NFR BLD AUTO: 0.3 %
BILIRUB SERPL-MCNC: 0.4 MG/DL (ref 0.2–1.3)
BILIRUB UR QL STRIP: NEGATIVE
BUN SERPL-MCNC: 7 MG/DL (ref 7–30)
CALCIUM SERPL-MCNC: 8.5 MG/DL (ref 8.5–10.1)
CHLORIDE SERPL-SCNC: 108 MMOL/L (ref 94–109)
CO2 SERPL-SCNC: 27 MMOL/L (ref 20–32)
COLOR UR AUTO: YELLOW
CREAT SERPL-MCNC: 0.49 MG/DL (ref 0.52–1.04)
DIFFERENTIAL METHOD BLD: ABNORMAL
EOSINOPHIL # BLD AUTO: 0.1 10E9/L (ref 0–0.7)
EOSINOPHIL NFR BLD AUTO: 2.1 %
ERYTHROCYTE [DISTWIDTH] IN BLOOD BY AUTOMATED COUNT: 13.9 % (ref 10–15)
GFR SERPL CREATININE-BSD FRML MDRD: >90 ML/MIN/1.7M2
GLUCOSE SERPL-MCNC: 76 MG/DL (ref 70–99)
GLUCOSE UR STRIP-MCNC: NEGATIVE MG/DL
HCG UR QL: NEGATIVE
HCT VFR BLD AUTO: 36.1 % (ref 35–47)
HGB BLD-MCNC: 11.7 G/DL (ref 11.7–15.7)
HGB UR QL STRIP: ABNORMAL
IMM GRANULOCYTES # BLD: 0 10E9/L (ref 0–0.4)
IMM GRANULOCYTES NFR BLD: 0.3 %
INTERNAL QC OK POCT: YES
KETONES UR STRIP-MCNC: NEGATIVE MG/DL
LEUKOCYTE ESTERASE UR QL STRIP: ABNORMAL
LIPASE SERPL-CCNC: 105 U/L (ref 73–393)
LYMPHOCYTES # BLD AUTO: 1.9 10E9/L (ref 0.8–5.3)
LYMPHOCYTES NFR BLD AUTO: 48.7 %
MCH RBC QN AUTO: 27.7 PG (ref 26.5–33)
MCHC RBC AUTO-ENTMCNC: 32.4 G/DL (ref 31.5–36.5)
MCV RBC AUTO: 85 FL (ref 78–100)
MONOCYTES # BLD AUTO: 0.3 10E9/L (ref 0–1.3)
MONOCYTES NFR BLD AUTO: 8.5 %
MUCOUS THREADS #/AREA URNS LPF: PRESENT /LPF
NEUTROPHILS # BLD AUTO: 1.6 10E9/L (ref 1.6–8.3)
NEUTROPHILS NFR BLD AUTO: 40.1 %
NITRATE UR QL: NEGATIVE
NRBC # BLD AUTO: 0 10*3/UL
NRBC BLD AUTO-RTO: 0 /100
PH UR STRIP: 5.5 PH (ref 5–7)
PLATELET # BLD AUTO: 182 10E9/L (ref 150–450)
POTASSIUM SERPL-SCNC: 3.9 MMOL/L (ref 3.4–5.3)
PROT SERPL-MCNC: 7.3 G/DL (ref 6.8–8.8)
RBC # BLD AUTO: 4.23 10E12/L (ref 3.8–5.2)
RBC #/AREA URNS AUTO: 23 /HPF (ref 0–2)
SODIUM SERPL-SCNC: 143 MMOL/L (ref 133–144)
SOURCE: ABNORMAL
SP GR UR STRIP: 1.01 (ref 1–1.03)
SQUAMOUS #/AREA URNS AUTO: 3 /HPF (ref 0–1)
UROBILINOGEN UR STRIP-MCNC: NORMAL MG/DL (ref 0–2)
WBC # BLD AUTO: 3.9 10E9/L (ref 4–11)
WBC #/AREA URNS AUTO: 12 /HPF (ref 0–5)

## 2018-07-25 PROCEDURE — 99284 EMERGENCY DEPT VISIT MOD MDM: CPT | Mod: Z6 | Performed by: EMERGENCY MEDICINE

## 2018-07-25 PROCEDURE — 81001 URINALYSIS AUTO W/SCOPE: CPT | Performed by: EMERGENCY MEDICINE

## 2018-07-25 PROCEDURE — 87086 URINE CULTURE/COLONY COUNT: CPT | Performed by: EMERGENCY MEDICINE

## 2018-07-25 PROCEDURE — 80053 COMPREHEN METABOLIC PANEL: CPT | Performed by: EMERGENCY MEDICINE

## 2018-07-25 PROCEDURE — 25000125 ZZHC RX 250: Performed by: EMERGENCY MEDICINE

## 2018-07-25 PROCEDURE — 74177 CT ABD & PELVIS W/CONTRAST: CPT

## 2018-07-25 PROCEDURE — 96360 HYDRATION IV INFUSION INIT: CPT

## 2018-07-25 PROCEDURE — 25000128 H RX IP 250 OP 636: Performed by: EMERGENCY MEDICINE

## 2018-07-25 PROCEDURE — 85025 COMPLETE CBC W/AUTO DIFF WBC: CPT | Performed by: EMERGENCY MEDICINE

## 2018-07-25 PROCEDURE — 99285 EMERGENCY DEPT VISIT HI MDM: CPT | Mod: 25

## 2018-07-25 PROCEDURE — T1013 SIGN LANG/ORAL INTERPRETER: HCPCS | Mod: U3

## 2018-07-25 PROCEDURE — 81025 URINE PREGNANCY TEST: CPT | Performed by: EMERGENCY MEDICINE

## 2018-07-25 PROCEDURE — 76705 ECHO EXAM OF ABDOMEN: CPT

## 2018-07-25 PROCEDURE — 83690 ASSAY OF LIPASE: CPT | Performed by: EMERGENCY MEDICINE

## 2018-07-25 RX ORDER — SENNOSIDES 8.6 MG
2 TABLET ORAL DAILY
COMMUNITY
End: 2019-10-24

## 2018-07-25 RX ORDER — IOPAMIDOL 755 MG/ML
100 INJECTION, SOLUTION INTRAVASCULAR ONCE
Status: COMPLETED | OUTPATIENT
Start: 2018-07-25 | End: 2018-07-25

## 2018-07-25 RX ORDER — CEFDINIR 300 MG/1
300 CAPSULE ORAL 2 TIMES DAILY
Qty: 10 CAPSULE | Refills: 0 | Status: SHIPPED | OUTPATIENT
Start: 2018-07-25 | End: 2018-07-30

## 2018-07-25 RX ADMIN — IOPAMIDOL 90 ML: 755 INJECTION, SOLUTION INTRAVENOUS at 21:54

## 2018-07-25 RX ADMIN — SODIUM CHLORIDE 61 ML: 9 INJECTION, SOLUTION INTRAVENOUS at 21:55

## 2018-07-25 RX ADMIN — SODIUM CHLORIDE 1000 ML: 9 INJECTION, SOLUTION INTRAVENOUS at 21:26

## 2018-07-25 ASSESSMENT — ENCOUNTER SYMPTOMS
COLOR CHANGE: 0
ABDOMINAL PAIN: 1
FATIGUE: 1
LIGHT-HEADEDNESS: 1
DYSURIA: 0
DIFFICULTY URINATING: 0
NAUSEA: 0
FEVER: 0
FLANK PAIN: 0
EYE REDNESS: 0
BACK PAIN: 0
ARTHRALGIAS: 0
NECK STIFFNESS: 0
ACTIVITY CHANGE: 1
CONSTIPATION: 1
VOMITING: 0
HEADACHES: 0
DIAPHORESIS: 1
CONFUSION: 0
SHORTNESS OF BREATH: 0

## 2018-07-25 NOTE — ED AVS SNAPSHOT
[facility] [place], Emergency Department    [address]    [place] [address]    Phone:  [phone]    Fax:  [phone]                                       Terrance Mcwilliams   MRN: [id number]    Department:  [facility], Emergency Department   Date of Visit:  7/25/2018           After Visit Summary Signature Page     I have received my discharge instructions, and my questions have been answered. I have discussed any challenges I see with this plan with the nurse or doctor.    ..........................................................................................................................................  Patient/Patient Representative Signature      ..........................................................................................................................................  Patient Representative Print Name and Relationship to Patient    ..................................................               ................................................  Date                                            Time    ..........................................................................................................................................  Reviewed by Signature/Title    ...................................................              ..............................................  Date                                                            Time

## 2018-07-25 NOTE — ED AVS SNAPSHOT
[facility], Emergency Department    [address]    [place] [address]    Phone:  [phone]    Fax:  [phone]                                       Terrance Mcwilliams   MRN: [id number]    Department:  [facility], Emergency Department   Date of Visit:  7/25/2018           Patient Information     Date Of Birth          1980        Your diagnoses for this visit were:     Abnormal finding on urinalysis     Chronic abdominal pain        You were seen by Pamela Pickard MD.        Discharge Instructions       You have been seen in the emergency department for your abdominal pain.  Your blood tests today are normal.  Your urine tests show that you might have the beginning of a bladder infection.  We are treating you with antibiotics for this.  Your CT scan today does not show anything new on your pictures.  You have a kidney stone which has not changed and is not causing problems.  You also have fibroids in your uterus which were already known.  There is nothing new on the CT scan today.  You should follow-up with your primary clinic for ongoing evaluation for your chronic abdominal pain.    Your next 10 appointments already scheduled     Jul 30, 2018 10:40 AM CDT   Return Visit with [facility] ([facility])    [facility] [address],  [address]  [place] [address]   [phone]              24 Hour Appointment Hotline       To make an appointment at any [facility], call [phone] ([phone]). If you don't have a family doctor or clinic, we will help you find one. [facility] are conveniently located to serve the needs of you and your family.             Review of your medicines      START taking        Dose / Directions Last dose taken    cefdinir 300 MG capsule   Commonly known as:  OMNICEF   Dose:  300 mg   Quantity:  10 capsule        Take 1 capsule (300 mg) by mouth 2 times daily for 5 days   Refills:  0          Our  records show that you are taking the medicines listed below. If these are incorrect, please call your family doctor or clinic.        Dose / Directions Last dose taken    levonorgestrel-ethinyl estradiol 0.1-20 MG-MCG per tablet   Commonly known as:  CRUZ YOUNGER LESSINA   Dose:  1 tablet   Quantity:  84 tablet        Take 1 tablet by mouth daily   Refills:  3        polyethylene glycol powder   Commonly known as:  MIRALAX   Dose:  1 capful   Quantity:  510 g        Take 17 g (1 capful) by mouth daily   Refills:  1        sennosides 8.6 MG tablet   Commonly known as:  SENOKOT   Dose:  2 tablet        Take 2 tablets by mouth daily   Refills:  0        tamsulosin 0.4 MG capsule   Commonly known as:  FLOMAX   Dose:  0.4 mg   Quantity:  30 capsule        Take 1 capsule (0.4 mg) by mouth daily   Refills:  0        TYLENOL PO        Refills:  0                Prescriptions were sent or printed at these locations (1 Prescription)                   Other Prescriptions                Printed at Department/Unit printer (1 of 1)         cefdinir (OMNICEF) 300 MG capsule                Procedures and tests performed during your visit     CBC with platelets differential    CT Abdomen Pelvis w Contrast    Comprehensive metabolic panel    Lipase    UA with Microscopic reflex to Culture    Urine Culture Aerobic Bacterial    hCG qual urine POCT      Orders Needing Specimen Collection     None      Pending Results     Date and Time Order Name Status Description    7/25/2018 1934 Urine Culture Aerobic Bacterial Preliminary             Pending Culture Results     Date and Time Order Name Status Description    7/25/2018 1934 Urine Culture Aerobic Bacterial Preliminary             Pending Results Instructions     If you had any lab results that were not finalized at the time of your Discharge, you can call the ED Lab Result RN at [phone]. You will be contacted by this team for any positive Lab results or changes in treatment. The  "nurses are available 7 days a week from 10A to 6:30P.  You can leave a message 24 hours per day and they will return your call.        Thank you for choosing [facility]       Thank you for choosing [facility] for your care. Our goal is always to provide you with excellent care. Hearing back from our patients is one way we can continue to improve our services. Please take a few minutes to complete the written survey that you may receive in the mail after you visit with us. Thank you!        [employer] Information     [employer] lets you send messages to your doctor, view your test results, renew your prescriptions, schedule appointments and more. To sign up, go to [email or web address] . Click on \"Log in\" on the left side of the screen, which will take you to the Welcome page. Then click on \"Sign up Now\" on the right side of the page.     You will be asked to enter the access code listed below, as well as some personal information. Please follow the directions to create your username and password.     Your access code is: [email or web address]  Expires: 2018  1:18 PM     Your access code will  in 90 days. If you need help or a new code, please call your [facility] clinic or [phone].        Care EveryWhere ID     This is your Care EveryWhere ID. This could be used by other organizations to access your [facility] medical records  [id number]        Equal Access to Services     SUE GODOY : Hadii billy Ann, waaxjavon aguilar, qaybta shelly hatfield . So [facility] [phone].    ATENCIÓN: Si habla español, tiene a dalal disposición servicios gratuitos de asistencia lingüística. Toy al [phone].    We comply with applicable federal civil rights laws and [place] laws. We do not discriminate on the basis of race, color, national origin, age, disability, sex, sexual orientation, or gender identity.            After Visit Summary       This is your record. Keep this with " you and show to your community pharmacist(s) and doctor(s) at your next visit.

## 2018-07-26 LAB
BACTERIA SPEC CULT: NORMAL
Lab: NORMAL
SPECIMEN SOURCE: NORMAL

## 2018-07-26 NOTE — DISCHARGE INSTRUCTIONS
You have been seen in the emergency department for your abdominal pain.  Your blood tests today are normal.  Your urine tests show that you might have the beginning of a bladder infection.  We are treating you with antibiotics for this.  Your CT scan today does not show anything new on your pictures.  You have a kidney stone which has not changed and is not causing problems.  You also have fibroids in your uterus which were already known.  There is nothing new on the CT scan today.  You should follow-up with your primary clinic for ongoing evaluation for your chronic abdominal pain.

## 2018-07-26 NOTE — ED PROVIDER NOTES
"  History     Chief Complaint   Patient presents with     Abdominal Pain     LLQ abd pain for past 2 months. Pt was supposed to have U/S and CT today per outpt provider but she saw that the paperwork said \"R side\" and called the clinic but they did not call her back.     The history is provided by the patient.     Terrance Mcwilliams is a 38 year old female with a known kidney stone and prior ovarian cyst who presents with worsening waxing and waning abdominal pain for approximately 5 months. She has been undergoing work up for this with her PCP. Today she reports that the pain was gradual in onset and does not recall a specific inciting event. She did fall in January but the pain proceeded this. She states that initially the pain was mostly in the left upper quadrant (she gestures to this area) but it now has some radiation across her abdomen to the right side. She describes it as achey and it comes and goes throughout the day. Her episodes of pain typically occur following meals, with activity and bending, they last for 10-20 minutes. These episodes are accompanied by the patient feeling sweaty, faint and light headed/dizzy. She endorses constipation controlled with stool softeners. She has noted black color in her stools, the most recent time being 2-3 weeks ago. She denies hematochezia. She also endorses heartburn. States the pain is 7-8/10 during the episodes and that it is getting to the point that it intereferes with her daily activities and ability to work stocking shelves at [employer]. Her LMP was ~6 months ago, she said a UPT 3 months ago was negative. Denies history of ectopic pregnancy. She had a  in  otherwise denies other abdominal surgeries.     States she came in tonight because she \"just wants to know what is going on because the pain is getting so bad.\"  She did have an RUQ abdominal ultra sound earlier today as per her PCP's work up to rule out gallbladder pathology. She was scheduled to have " a CT scan but cancelled due to concerns that her ultrasound was on the right side and that her pain is on the left side. She states she would be interested in the abdominal CT or a scope.     I have reviewed the Medications, Allergies, Past Medical and Surgical History, and Social History in the [employer] system.    Past Medical History:   Diagnosis Date     Kidney stone 10/08       Past Surgical History:   Procedure Laterality Date     C/SECTION, LOW TRANSVERSE  10/09    failure to progress       Family History   Problem Relation Age of Onset     Hypertension Mother      Hyperlipidemia Mother      Diabetes Mother      Cancer Mother      throat     Unknown/Adopted Father       of sickness age 65     Glaucoma No family hx of      Macular Degeneration No family hx of      Breast Cancer No family hx of        Social History   Substance Use Topics     Smoking status: Never Smoker     Smokeless tobacco: Never Used     Alcohol use No       Review of Systems   Constitutional: Positive for activity change, diaphoresis and fatigue. Negative for fever.   HENT: Negative for congestion.    Eyes: Negative for redness.   Respiratory: Negative for shortness of breath.    Cardiovascular: Negative for chest pain.   Gastrointestinal: Positive for abdominal pain and constipation. Negative for nausea and vomiting.        Black color in stool 2-3 weeks ago   Genitourinary: Negative for difficulty urinating, dysuria, flank pain and vaginal bleeding.   Musculoskeletal: Negative for arthralgias, back pain and neck stiffness.   Skin: Negative for color change.   Neurological: Positive for light-headedness. Negative for headaches.   Psychiatric/Behavioral: Negative for confusion.   All other systems reviewed and are negative.      Physical Exam   BP: 134/76  Pulse: 75  Temp: 96.7  F (35.9  C)  Resp: 16  SpO2: 100 %      Physical Exam   Constitutional: She is oriented to person, place, and time. She appears well-nourished. No distress.    Middle aged female, appears older then stated age, sitting up in hospital bed, looks somewhat uncomfortable. Has hospital gown on over her clothes.    HENT:   Head: Atraumatic.   Mouth/Throat: Oropharynx is clear and moist. No oropharyngeal exudate.   Eyes: Pupils are equal, round, and reactive to light. No scleral icterus.   Cardiovascular: Normal heart sounds and intact distal pulses.    Pulmonary/Chest: Breath sounds normal. No respiratory distress.   Abdominal: Soft. Bowel sounds are normal. She exhibits no distension, no abdominal bruit, no ascites and no mass. There is tenderness in the left upper quadrant. There is no rigidity, no rebound and no guarding.       Musculoskeletal: She exhibits no edema.   Neurological: She is alert and oriented to person, place, and time.   Skin: Skin is warm and dry. No rash noted. She is not diaphoretic.   Psychiatric: Her speech is normal. Her mood appears anxious.   Nursing note and vitals reviewed.      ED Course     ED Course     Procedures             Critical Care time:  none        US from earlier today     Results for orders placed or performed during the hospital encounter of 07/25/18 (from the past 24 hour(s))   UA with Microscopic reflex to Culture   Result Value Ref Range    Color Urine Yellow     Appearance Urine Clear     Glucose Urine Negative NEG^Negative mg/dL    Bilirubin Urine Negative NEG^Negative    Ketones Urine Negative NEG^Negative mg/dL    Specific Gravity Urine 1.008 1.003 - 1.035    Blood Urine Moderate (A) NEG^Negative    pH Urine 5.5 5.0 - 7.0 pH    Protein Albumin Urine Negative NEG^Negative mg/dL    Urobilinogen mg/dL Normal 0.0 - 2.0 mg/dL    Nitrite Urine Negative NEG^Negative    Leukocyte Esterase Urine Small (A) NEG^Negative    Source Midstream Urine     WBC Urine 12 (H) 0 - 5 /HPF    RBC Urine 23 (H) 0 - 2 /HPF    Bacteria Urine Few (A) NEG^Negative /HPF    Squamous Epithelial /HPF Urine 3 (H) 0 - 1 /HPF    Mucous Urine Present (A)  NEG^Negative /LPF   Urine Culture Aerobic Bacterial   Result Value Ref Range    Specimen Description Midstream Urine     Special Requests Specimen received in preservative     Culture Micro PENDING    CBC with platelets differential   Result Value Ref Range    WBC 3.9 (L) 4.0 - 11.0 10e9/L    RBC Count 4.23 3.8 - 5.2 10e12/L    Hemoglobin 11.7 11.7 - 15.7 g/dL    Hematocrit 36.1 35.0 - 47.0 %    MCV 85 78 - 100 fl    MCH 27.7 26.5 - 33.0 pg    MCHC 32.4 31.5 - 36.5 g/dL    RDW 13.9 10.0 - 15.0 %    Platelet Count 182 150 - 450 10e9/L    Diff Method Automated Method     % Neutrophils 40.1 %    % Lymphocytes 48.7 %    % Monocytes 8.5 %    % Eosinophils 2.1 %    % Basophils 0.3 %    % Immature Granulocytes 0.3 %    Nucleated RBCs 0 0 /100    Absolute Neutrophil 1.6 1.6 - 8.3 10e9/L    Absolute Lymphocytes 1.9 0.8 - 5.3 10e9/L    Absolute Monocytes 0.3 0.0 - 1.3 10e9/L    Absolute Eosinophils 0.1 0.0 - 0.7 10e9/L    Absolute Basophils 0.0 0.0 - 0.2 10e9/L    Abs Immature Granulocytes 0.0 0 - 0.4 10e9/L    Absolute Nucleated RBC 0.0    Comprehensive metabolic panel   Result Value Ref Range    Sodium 143 133 - 144 mmol/L    Potassium 3.9 3.4 - 5.3 mmol/L    Chloride 108 94 - 109 mmol/L    Carbon Dioxide 27 20 - 32 mmol/L    Anion Gap 8 3 - 14 mmol/L    Glucose 76 70 - 99 mg/dL    Urea Nitrogen 7 7 - 30 mg/dL    Creatinine 0.49 (L) 0.52 - 1.04 mg/dL    GFR Estimate >90 >60 mL/min/1.7m2    GFR Estimate If Black >90 >60 mL/min/1.7m2    Calcium 8.5 8.5 - 10.1 mg/dL    Bilirubin Total 0.4 0.2 - 1.3 mg/dL    Albumin 3.5 3.4 - 5.0 g/dL    Protein Total 7.3 6.8 - 8.8 g/dL    Alkaline Phosphatase 62 40 - 150 U/L    ALT 19 0 - 50 U/L    AST 16 0 - 45 U/L   hCG qual urine POCT   Result Value Ref Range    HCG Qual Urine Negative neg    Internal QC OK Yes    Lipase   Result Value Ref Range    Lipase 105 73 - 393 U/L   CT Abdomen Pelvis w Contrast    Narrative    CT ABDOMEN AND PELVIS WITH CONTRAST 7/25/2018 9:54 PM     HISTORY: Left  periumbilical and LLQ pain ongoing months, evaluate for  mass or infection.      TECHNIQUE: Axial images from the lung bases to the symphysis are  performed with additional coronal reformatted images. 80 mL of Isovue  370 are given intravenously.  Radiation dose for this scan was reduced  using automated exposure control, adjustment of the mA and/or kV  according to patient size, or iterative reconstruction technique.    FINDINGS:     The lung bases are clear.    Abdomen: The liver, spleen, gallbladder, pancreas and adrenal glands  are unremarkable. Right kidney is within normal limits. No  hydronephrosis or renal calculi. Left renal collecting system stones  are present with a larger stone on image 53 of series 2 measuring up  to 1.4 cm. Additional 0.4 cm stone is noted on image 54. Lateral left  renal cyst measures 1.5 cm and is likely a simple cyst measuring near  water density. No hydronephrosis or ureteral calculi. The bowel is  normal in caliber without obstruction or diverticulitis. Appendix not  visualized.    Pelvis: The bladder and rectum are unremarkable. No adnexal mass.  Uterus demonstrates a partially calcified fibroid in the right fundal  region on image 98. No enlarged pelvic or inguinal lymph nodes. Bone  window examination is unremarkable.      Impression    IMPRESSION:  1. Nonobstructing left renal collecting system stones. No  hydronephrosis. No right urinary tract calculi.  2. Remaining abdominal and pelvic organs are within normal limits.  Calcified right fundal fibroid is noted. No bowel obstruction or  diverticulitis. Appendix not visualized.    AAMIR LIU MD            Assessments & Plan (with Medical Decision Making)   Terrance Mcwilliams is a middle age female who presents with worsening of her chronic abdominal pain. She does not have any new symptoms or physical exam findings that would raise concern for acute or life threatening intraabdominal or cardiac pathology at this time. Low  suspicion for ectopic pregnancy given negative UPT. Her US this morning showed her known kidney stone with no gallbladder pathology.  She had a CT scan done here in the emergency department which shows her known left-sided kidney stone which is nonobstructing and unchanged, she also has known uterine fibroids, but nothing acute.  Will refer patient back to her PCP for ongoing evaluation of her chronic abdominal pain.  For her slightly abnormal UA, given her pain, we will treat her with a course of antibiotics.  I have reviewed the nursing notes.    I have reviewed the findings, diagnosis, plan and need for follow up with the patient.    Discharge Medication List as of 7/25/2018 10:50 PM      START taking these medications    Details   cefdinir (OMNICEF) 300 MG capsule Take 1 capsule (300 mg) by mouth 2 times daily for 5 days, Disp-10 capsule, R-0, Local Print             Final diagnoses:   Abnormal finding on urinalysis   Chronic abdominal pain       7/25/2018   [facility], [place], EMERGENCY DEPARTMENT    Valeria Osorio MD    ---------------------------------------------------------------------------------------------------------------------  ED Staff Attestation:  I have seen the patient with the above resident and I agree with the documentation. I have independently evaluated this patient separately from the resident. The above note reflects our jointly agreed upon assessment and plan. Briefly, this is a 38-year-old [place] female who presents to the ED today with chronic ongoing left periumbilical pain. This has been going on for years, worse in the past couple of months. She has been seen by her clinic for this and had outpatient workup including abdominal x-ray and she has been treated for constipation, also has had a pelvic ultrasound which showed uterine myomas, otherwise no other etiology. She saw her clinic just yesterday and had orders done including a right upper quadrant ultrasound which was done today.  This was normal. She had a CT scan which was also ordered, this was supposed to be done today but was not done. The patient did not end up going to her CT scan today because she was under the impression that they were only going to look at the right side of her abdomen and she is complaining of left-sided abdominal pain.    Differential diagnosis is extensive. Given the chronicity of her problems, I think it is far less likely to be something acute such as ectopic pregnancy, TOA, ovarian torsion, ruptured cyst, need to consider diverticulitis, constipation, functional abdominal pain/psychosomatic etiology, malignancy, amongst many other etiologies. We did do basic labs here in the Emergency Department. CBC today is within normal limits with the exception of a white count 3.9, CMP within normal limits and lipase within normal limits. UA demonstrates 12 white cells, 23 red cells, small leukocyte esterase and UPT negative. We did do an abdominal CT scan which demonstrates nothing acute, results as above given her symptoms,. Patient was given IV fluids in the Emergency Department.  It may be reasonable to treat the UA.  We have given her a course of Omnicef.  It is going to be appropriate for her to follow-up with her primary clinic for further evaluation of her ongoing chronic abdominal pain of several months duration.   It may be reasonable at some point to consider endoscopy and/or colonoscopy but this would need to be done through her primary clinic.     Pamela Pickard MD  07/26/18 0014

## 2018-07-30 ENCOUNTER — OFFICE VISIT (OUTPATIENT)
Dept: FAMILY MEDICINE | Facility: CLINIC | Age: 38
End: 2018-07-30
Payer: COMMERCIAL

## 2018-07-30 VITALS
TEMPERATURE: 98.4 F | SYSTOLIC BLOOD PRESSURE: 110 MMHG | HEART RATE: 74 BPM | RESPIRATION RATE: 16 BRPM | BODY MASS INDEX: 29.07 KG/M2 | OXYGEN SATURATION: 98 % | WEIGHT: 185.6 LBS | DIASTOLIC BLOOD PRESSURE: 74 MMHG

## 2018-07-30 DIAGNOSIS — R10.84 ABDOMINAL PAIN, GENERALIZED: Primary | ICD-10-CM

## 2018-07-30 RX ORDER — CHLORHEXIDINE GLUCONATE ORAL RINSE 1.2 MG/ML
SOLUTION DENTAL
Refills: 0 | COMMUNITY
Start: 2018-05-10 | End: 2019-10-24

## 2018-07-30 RX ORDER — IBUPROFEN 600 MG/1
600 TABLET, FILM COATED ORAL
COMMUNITY
Start: 2018-01-23 | End: 2019-10-24

## 2018-07-30 NOTE — MR AVS SNAPSHOT
After Visit Summary   7/30/2018    Terrance Mcwilliams    MRN: [id number]           Patient Information     Date Of Birth          1980        Visit Information        Provider Department      7/30/2018 10:40 AM Zoe Edouard MD [facility]        Today's Diagnoses     Abdominal pain, generalized    -  1      Care Instructions    Abdominal Pain  1. I will write a referral to a Gastrointestinal specialist.  2. If specialist doesn't find anything, we will go in with a scope.  3. Avoid using ibuprofen until we know what's causing the abdominal pain. I will prescribe Omeprazole: take once a day, 30 minutes before eating.  4. I will order the tests today.  5. Follow-up with me in 1 week.          Follow-ups after your visit        Additional Services     GASTROENTEROLOGY ADULT REF CONSULT ONLY       Preferred Location: [facility] [phone]      Please be aware that coverage of these services is subject to the terms and limitations of your health insurance plan.  Call member services at your health plan with any benefit or coverage questions.  Any procedures must be performed at a [facility] facility OR coordinated by your clinic's referral office.    Please bring the following with you to your appointment:    (1) Any X-Rays, CTs or MRIs which have been performed.  Contact the facility where they were done to arrange for  prior to your scheduled appointment.    (2) List of current medications   (3) This referral request   (4) Any documents/labs given to you for this referral            GASTROENTEROLOGY ADULT REF PROCEDURE ONLY       Last Lab Result: Creatinine (mg/dL)       Date                     Value                 07/25/2018               0.49 (L)         ----------  Body mass index is 29.07 kg/(m^2).      Needed:  Yes  Language:  Salvadorean    Patient will be contacted to schedule procedure.     Please be aware that coverage of these services is subject to the  terms and limitations of your health insurance plan.  Call member services at your health plan with any benefit or coverage questions.  Any procedures must be performed at a [facility] facility OR coordinated by your clinic's referral office.    Please bring the following with you to your appointment:    (1) Any X-Rays, CTs or MRIs which have been performed.  Contact the facility where they were done to arrange for  prior to your scheduled appointment.    (2) List of current medications   (3) This referral request   (4) Any documents/labs given to you for this referral    37yo F w/ abd pain worse w/ food but also spontaneous, severe, going to ED. Has stable L kidney stone. Stools soft and frequent w/ miralax. Wants colonoscopy to prove not CA, mom has CA in refugee [place] in [place], may be gastritis/PUD. Otw healthy   37yo [place] F with abdominal pain                  Your next 10 appointments already scheduled     Aug 08, 2018 10:40 AM CDT   Return Visit with MD [facility] ([facility])    [address],  [address]  [place] [address]   [phone]              Who to contact     Please call your clinic at [phone] to:    Ask questions about your health    Make or cancel appointments    Discuss your medicines    Learn about your test results    Speak to your doctor            Additional Information About Your Visit        Care EveryWhere ID     This is your Care EveryWhere ID. This could be used by other organizations to access your [facility] medical records  [id number]        Your Vitals Were     Pulse Temperature Respirations Pulse Oximetry BMI (Body Mass Index)       74 98.4  F (36.9  C) (Oral) 16 98% 29.07 kg/m2        Blood Pressure from Last 3 Encounters:   07/30/18 110/74   07/25/18 121/85   07/24/18 112/77    Weight from Last 3 Encounters:   07/30/18 185 lb 9.6 oz (84.2 kg)   07/24/18 184 lb 3.2 oz (83.6 kg)   07/02/18 185 lb 3.2 oz (84 kg)               We Performed the Following     GASTROENTEROLOGY ADULT REF CONSULT ONLY     GASTROENTEROLOGY ADULT REF PROCEDURE ONLY          Today's Medication Changes          These changes are accurate as of 7/30/18 12:44 PM.  If you have any questions, ask your nurse or doctor.               Start taking these medicines.        Dose/Directions    omeprazole 20 MG CR capsule   Commonly known as:  priLOSEC   Used for:  Abdominal pain, generalized   Started by:  Zoe Edouard MD        Dose:  20 mg   Take 1 capsule (20 mg) by mouth daily   Quantity:  60 capsule   Refills:  3         Stop taking these medicines if you haven't already. Please contact your care team if you have questions.     tamsulosin 0.4 MG capsule   Commonly known as:  FLOMAX   Stopped by:  Zoe Edouard MD                Where to get your medicines      These medications were sent to [facility] [address]   [address] [facility] [address]     Phone:  [phone]     omeprazole 20 MG CR capsule                Primary Care Provider Office Phone # Fax #    Zoe Edouard [phone]       [address] [facility] [address]        Equal Access to Services     [facility]: Hadii billy ku hadasho Soomaali, waaxda luqadaha, qaybta kaalmada adeegyada, waxay ben wei . So [facility] [phone].    ATENCIÓN: Si habla español, tiene a dalal disposición servicios gratuitos de asistencia lingüística. Llame al [phone].    We comply with applicable federal civil rights laws and [place] laws. We do not discriminate on the basis of race, color, national origin, age, disability, sex, sexual orientation, or gender identity.            Thank you!     Thank you for choosing [facility]  for your care. Our goal is always to provide you with excellent care. Hearing back from our patients is one way we can continue to improve our services. Please take a few minutes to  complete the written survey that you may receive in the mail after your visit with us. Thank you!             Your Updated Medication List - Protect others around you: Learn how to safely use, store and throw away your medicines at www.disposemymeds.org.          This list is accurate as of 7/30/18 12:44 PM.  Always use your most recent med list.                   Brand Name Dispense Instructions for use Diagnosis    cefdinir 300 MG capsule    OMNICEF    10 capsule    Take 1 capsule (300 mg) by mouth 2 times daily for 5 days        chlorhexidine 0.12 % solution    PERIDEX          ibuprofen 600 MG tablet    ADVIL/MOTRIN     Take 600 mg by mouth        levonorgestrel-ethinyl estradiol 0.1-20 MG-MCG per tablet    AVIANE,ALESSE,LESSINA    84 tablet    Take 1 tablet by mouth daily    Premature ovarian failure       omeprazole 20 MG CR capsule    priLOSEC    60 capsule    Take 1 capsule (20 mg) by mouth daily    Abdominal pain, generalized       polyethylene glycol powder    MIRALAX    510 g    Take 17 g (1 capful) by mouth daily    Constipation, unspecified constipation type       sennosides 8.6 MG tablet    SENOKOT     Take 2 tablets by mouth daily        TYLENOL PO           vitamin D3 1000 units Caps      Take 1,000 Units by mouth

## 2018-07-30 NOTE — PATIENT INSTRUCTIONS
Abdominal Pain  1. I will write a referral to a Gastrointestinal specialist.  2. If specialist doesn't find anything, we will go in with a scope.  3. Avoid using ibuprofen until we know what's causing the abdominal pain. I will prescribe Omeprazole: take once a day, 30 minutes before eating.  4. I will order the tests today.  5. Follow-up with me in 1 week.

## 2018-07-30 NOTE — PROGRESS NOTES
HPI       Terrance Mcwilliams is a 38 year old  who presents for   Chief Complaint   Patient presents with     RECHECK     Follow up abdominal pain; No concerns     ER Follow-up  No change since she was seen at the ER. Was prescribed a course of ABX, but still has abd pain. Pt feels like the pain is in her intestines. Pt wonders if she could get a colonoscopy. When she has the episodes of abd pain, she also has accompanying headaches, but had headaches prior to abd pain. Abd pain is immediately after eating a meal, sx are exacerbated with sour foods. Abd pain onset is both early and late associated with meals. Had watermelon this morning, and had pain immediately after. Abd pain is worst when eating rice and meat, bland foods make sx better. Pt wonders if abd pain is due to cancer.         Social Hx  Mother is very sick, would like to visit her in [facility], but is waiting to receive a diagnosis for her abd pain.    Family Hx  Mother- Throat cancer.    [facility]  was used for  this visit.    +++++++      Problem, Medication and Allergy Lists were reviewed and updated if needed..    Patient is an established patient of this clinic..         Review of Systems:   Review of Systems    Negative for coughing, wieght change, nausea, vomiting, mucus in throat, sore throat.    Positive for occasional GI reflux/burning.     See HPI for additional sx.    This document serves as a record of the services and decisions personally performed and made by Zoe Edouard MD. It was created on his/her behalf by Comfort Knight, a trained medical scribe. The creation of this document is based the provider's statements to the medical scribe.  Scribe Comfort Knight 11:12 AM, July 30, 2018       Physical Exam:     Vitals:    07/30/18 1028   BP: 110/74   Pulse: 74   Resp: 16   Temp: 98.4  F (36.9  C)   TempSrc: Oral   SpO2: 98%   Weight: 185 lb 9.6 oz (84.2 kg)     Body mass index is 29.07 kg/(m^2).  Vitals were reviewed and were  normal     Physical Exam    BMI= Body mass index is 29.07 kg/(m^2).     GENERAL: healthy, alert and no distress  CV: regular rates and rhythm, normal S1 S2, no S3 or S4 and no murmur, no click or rub -  ABDOMEN: soft, no tenderness, no  hepatosplenomegaly, no masses, normal bowel sounds  PSYCH: Alert and oriented times 3; affect: normal.    Results:      Results from this visit  Results for orders placed or performed during the hospital encounter of 07/25/18   CT Abdomen Pelvis w Contrast    Narrative    CT ABDOMEN AND PELVIS WITH CONTRAST 7/25/2018 9:54 PM     HISTORY: Left periumbilical and LLQ pain ongoing months, evaluate for  mass or infection.      TECHNIQUE: Axial images from the lung bases to the symphysis are  performed with additional coronal reformatted images. 80 mL of Isovue  370 are given intravenously.  Radiation dose for this scan was reduced  using automated exposure control, adjustment of the mA and/or kV  according to patient size, or iterative reconstruction technique.    FINDINGS:     The lung bases are clear.    Abdomen: The liver, spleen, gallbladder, pancreas and adrenal glands  are unremarkable. Right kidney is within normal limits. No  hydronephrosis or renal calculi. Left renal collecting system stones  are present with a larger stone on image 53 of series 2 measuring up  to 1.4 cm. Additional 0.4 cm stone is noted on image 54. Lateral left  renal cyst measures 1.5 cm and is likely a simple cyst measuring near  water density. No hydronephrosis or ureteral calculi. The bowel is  normal in caliber without obstruction or diverticulitis. Appendix not  visualized.    Pelvis: The bladder and rectum are unremarkable. No adnexal mass.  Uterus demonstrates a partially calcified fibroid in the right fundal  region on image 98. No enlarged pelvic or inguinal lymph nodes. Bone  window examination is unremarkable.      Impression    IMPRESSION:  1. Nonobstructing left renal collecting system stones.  No  hydronephrosis. No right urinary tract calculi.  2. Remaining abdominal and pelvic organs are within normal limits.  Calcified right fundal fibroid is noted. No bowel obstruction or  diverticulitis. Appendix not visualized.    AAMIR LIU MD   CBC with platelets differential   Result Value Ref Range    WBC 3.9 (L) 4.0 - 11.0 10e9/L    RBC Count 4.23 3.8 - 5.2 10e12/L    Hemoglobin 11.7 11.7 - 15.7 g/dL    Hematocrit 36.1 35.0 - 47.0 %    MCV 85 78 - 100 fl    MCH 27.7 26.5 - 33.0 pg    MCHC 32.4 31.5 - 36.5 g/dL    RDW 13.9 10.0 - 15.0 %    Platelet Count 182 150 - 450 10e9/L    Diff Method Automated Method     % Neutrophils 40.1 %    % Lymphocytes 48.7 %    % Monocytes 8.5 %    % Eosinophils 2.1 %    % Basophils 0.3 %    % Immature Granulocytes 0.3 %    Nucleated RBCs 0 0 /100    Absolute Neutrophil 1.6 1.6 - 8.3 10e9/L    Absolute Lymphocytes 1.9 0.8 - 5.3 10e9/L    Absolute Monocytes 0.3 0.0 - 1.3 10e9/L    Absolute Eosinophils 0.1 0.0 - 0.7 10e9/L    Absolute Basophils 0.0 0.0 - 0.2 10e9/L    Abs Immature Granulocytes 0.0 0 - 0.4 10e9/L    Absolute Nucleated RBC 0.0    Comprehensive metabolic panel   Result Value Ref Range    Sodium 143 133 - 144 mmol/L    Potassium 3.9 3.4 - 5.3 mmol/L    Chloride 108 94 - 109 mmol/L    Carbon Dioxide 27 20 - 32 mmol/L    Anion Gap 8 3 - 14 mmol/L    Glucose 76 70 - 99 mg/dL    Urea Nitrogen 7 7 - 30 mg/dL    Creatinine 0.49 (L) 0.52 - 1.04 mg/dL    GFR Estimate >90 >60 mL/min/1.7m2    GFR Estimate If Black >90 >60 mL/min/1.7m2    Calcium 8.5 8.5 - 10.1 mg/dL    Bilirubin Total 0.4 0.2 - 1.3 mg/dL    Albumin 3.5 3.4 - 5.0 g/dL    Protein Total 7.3 6.8 - 8.8 g/dL    Alkaline Phosphatase 62 40 - 150 U/L    ALT 19 0 - 50 U/L    AST 16 0 - 45 U/L   UA with Microscopic reflex to Culture   Result Value Ref Range    Color Urine Yellow     Appearance Urine Clear     Glucose Urine Negative NEG^Negative mg/dL    Bilirubin Urine Negative NEG^Negative    Ketones Urine Negative  NEG^Negative mg/dL    Specific Gravity Urine 1.008 1.003 - 1.035    Blood Urine Moderate (A) NEG^Negative    pH Urine 5.5 5.0 - 7.0 pH    Protein Albumin Urine Negative NEG^Negative mg/dL    Urobilinogen mg/dL Normal 0.0 - 2.0 mg/dL    Nitrite Urine Negative NEG^Negative    Leukocyte Esterase Urine Small (A) NEG^Negative    Source Midstream Urine     WBC Urine 12 (H) 0 - 5 /HPF    RBC Urine 23 (H) 0 - 2 /HPF    Bacteria Urine Few (A) NEG^Negative /HPF    Squamous Epithelial /HPF Urine 3 (H) 0 - 1 /HPF    Mucous Urine Present (A) NEG^Negative /LPF   Lipase   Result Value Ref Range    Lipase 105 73 - 393 U/L   hCG qual urine POCT   Result Value Ref Range    HCG Qual Urine Negative neg    Internal QC OK Yes    Urine Culture Aerobic Bacterial   Result Value Ref Range    Specimen Description Midstream Urine     Special Requests Specimen received in preservative     Culture Micro <10,000 colonies/mL  mixed urogenital gaudencio          Assessment and Plan   Osssameer was seen today for recheck.    Diagnoses and all orders for this visit:    Abdominal pain, generalized  The information about the dietary impact is new and different than previously reported, making me more suspicious for GERD/PUD, but previously denied any reflux symptoms at all and now endorses some. This lady is typically quite stoic based on past pain encounters w/ musculoskeletal injuries, and am struck by how persistent and severe this pain is. Will try empiric PPI, schedule procedures (colonoscopy at her preference and due to maternal hx of CA, details uknown as she is in [place] in [facility] but somewhere along GI tract suspected.     -     GASTROENTEROLOGY ADULT REF PROCEDURE ONLY  -     omeprazole (PRILOSEC) 20 MG CR capsule; Take 1 capsule (20 mg) by mouth daily  -     GASTROENTEROLOGY ADULT REF CONSULT ONLY  -  CT scan results were reviewed in detail; ongoing pain could be renal vs. GI.   - No pyelo, no urinary retention, no appendicitis, no  diverticulitis, or splenic pathology.  Possible abd migraine vs. IBS vs.   - Otherwise healthy 73 y/o female with abd pain worsening with food in spontaneous  episodes- Severe stable left kidney stone that is unchanged, stools are softened  often with Mirilax. Pt is worried about cancer because mom has throat CA. Pursing  upper and lower scope after normal labs and a CT that was not revealing.    Medications Discontinued During This Encounter   Medication Reason     tamsulosin (FLOMAX) 0.4 MG capsule      AVS    Abdominal Pain  1. I will write a referral to a Gastrointestinal specialist.  2. If specialist doesn't find anything, we will go in with a scope.  3. Avoid using ibuprofen until we know what's causing the abdominal pain. I will prescribe Omeprazole: take once a day, 30 minutes before eating.  4. I will order the tests today.  5. Follow-up with me in 1 week.    Options for treatment and follow-up care were reviewed with the patient. Terrance Mcwilliams  engaged in the decision making process and verbalized understanding of the options discussed and agreed with the final plan.    The information in this document, created by the medical scribe for me, accurately reflects the services I personally performed and the decisions made by me. I have reviewed and approved this document for accuracy prior to leaving the patient care area.    Zoe Edouard MD  11:13 AM, 07/30/18

## 2018-08-03 ENCOUNTER — TELEPHONE (OUTPATIENT)
Dept: GASTROENTEROLOGY | Facility: CLINIC | Age: 38
End: 2018-08-03

## 2018-08-06 ENCOUNTER — TELEPHONE (OUTPATIENT)
Dept: GASTROENTEROLOGY | Facility: CLINIC | Age: 38
End: 2018-08-06

## 2018-08-07 ENCOUNTER — TELEPHONE (OUTPATIENT)
Dept: GASTROENTEROLOGY | Facility: CLINIC | Age: 38
End: 2018-08-07

## 2018-08-14 ENCOUNTER — TELEPHONE (OUTPATIENT)
Dept: GASTROENTEROLOGY | Facility: OUTPATIENT CENTER | Age: 38
End: 2018-08-14

## 2018-08-15 ENCOUNTER — TELEPHONE (OUTPATIENT)
Dept: GASTROENTEROLOGY | Facility: OUTPATIENT CENTER | Age: 38
End: 2018-08-15

## 2018-08-15 NOTE — TELEPHONE ENCOUNTER
Patient taking any blood thinners ? Ibuprofen prn    Heart disease ? denies    Lung disease ? denies      Sleep apnea ? denies    Diabetic ? denies    Kidney disease ? denies    Dialysis ? n/a    Electronic implanted medical devices ? denies    Are you taking any narcotic pain medication ?  no  What is your daily dosage ?    PTSD ? n/a    Prep instructions reviewed with patient ? Instructions,  policy, MAC sedation plan reviewed through [place] . Advised patient to have someone stay with her post exam    Pharmacy : [facility]    Indication for procedure : Abdominal pain, generalized [R10.84    Referring provider :Zoe Edouard MD     Arrival Time : 12:30 PM

## 2018-08-21 ENCOUNTER — DOCUMENTATION ONLY (OUTPATIENT)
Dept: GASTROENTEROLOGY | Facility: OUTPATIENT CENTER | Age: 38
End: 2018-08-21
Payer: COMMERCIAL

## 2018-08-21 ENCOUNTER — TRANSFERRED RECORDS (OUTPATIENT)
Dept: HEALTH INFORMATION MANAGEMENT | Facility: CLINIC | Age: 38
End: 2018-08-21

## 2018-08-21 DIAGNOSIS — R10.84 ABDOMINAL PAIN, GENERALIZED: Primary | ICD-10-CM

## 2018-08-23 LAB — COPATH REPORT: NORMAL

## 2018-08-29 DIAGNOSIS — N20.0 KIDNEY STONE: Primary | ICD-10-CM

## 2018-08-30 ENCOUNTER — TELEPHONE (OUTPATIENT)
Dept: INTERNAL MEDICINE | Facility: CLINIC | Age: 38
End: 2018-08-30

## 2018-08-30 ENCOUNTER — PRE VISIT (OUTPATIENT)
Dept: UROLOGY | Facility: CLINIC | Age: 38
End: 2018-08-30

## 2018-08-30 NOTE — TELEPHONE ENCOUNTER
MEDICAL RECORDS REQUEST   [facility]  Urology Clinic  [address] [place] [address]  PHONE: [phone]  Fax: [phone]        FUTURE VISIT INFORMATION                                                   Terrance Mcwilliams, : 1980 scheduled for future visit at [facility]    APPOINTMENT INFORMATION:    Date: 2018    Provider:  Luisana Ortiz    Reason for Visit/Diagnosis: Flank pain    REFERRAL INFORMATION:    Referring provider:  Zoe Edouard    Specialty: MD    Referring providers clinic:  [facility] contact number:[phone]    RECORDS REQUESTED FOR VISIT                                                     NOTES  STATUS/DETAILS   OFFICE NOTE from referring provider  no   OFFICE NOTE from other specialist  no   DISCHARGE SUMMARY from hospital  no   DISCHARGE REPORT from the ER  yes   OPERATIVE REPORT  no   MEDICATION LIST  yes       PRE-VISIT CHECKLIST      Record collection complete Yes   Appointment appropriately scheduled           (right time/right provider) Yes   MyChart activation Yes   Questionnaire complete If no, please explain in process     Completed by: Marina Justin

## 2018-08-30 NOTE — TELEPHONE ENCOUNTER
----- Message from Adelaide Hough CMA sent at 8/29/2018 10:06 AM CDT -----  Please call patient and have them get CT before appointment.      Thanks    Adelaide

## 2018-08-30 NOTE — TELEPHONE ENCOUNTER
Called patient and left a message with  CT Scan is schedule same day prior to seeing Dr. Ortiz (in Urology) on Sept 7. CT scan is needed and is being done at [facility] as there is no availablity at [facility] or [facility].

## 2018-09-04 ENCOUNTER — PRE VISIT (OUTPATIENT)
Dept: UROLOGY | Facility: CLINIC | Age: 38
End: 2018-09-04

## 2018-09-04 ENCOUNTER — OFFICE VISIT (OUTPATIENT)
Dept: FAMILY MEDICINE | Facility: CLINIC | Age: 38
End: 2018-09-04
Payer: COMMERCIAL

## 2018-09-04 VITALS
RESPIRATION RATE: 18 BRPM | WEIGHT: 185.2 LBS | TEMPERATURE: 97.9 F | OXYGEN SATURATION: 100 % | BODY MASS INDEX: 29.01 KG/M2 | DIASTOLIC BLOOD PRESSURE: 81 MMHG | HEART RATE: 76 BPM | SYSTOLIC BLOOD PRESSURE: 119 MMHG

## 2018-09-04 DIAGNOSIS — Z12.4 SCREENING FOR MALIGNANT NEOPLASM OF CERVIX: ICD-10-CM

## 2018-09-04 DIAGNOSIS — N89.8 VAGINAL DISCHARGE: ICD-10-CM

## 2018-09-04 DIAGNOSIS — R10.13 DYSPEPSIA: ICD-10-CM

## 2018-09-04 DIAGNOSIS — R10.11 RUQ ABDOMINAL PAIN: Primary | ICD-10-CM

## 2018-09-04 LAB
BACTERIA: NORMAL
CLUE CELLS: NORMAL
MOTILE TRICHOMONAS: NEGATIVE
ODOR: NORMAL
PH WET PREP: <4.5
WBC WET PREP: NORMAL
YEAST: NORMAL

## 2018-09-04 NOTE — NURSING NOTE
Due to patient being non-English speaking/uses sign language, an  was used for this visit. Only for face-to-face interpretation by an external agency, date and length of interpretation can be found on the scanned worksheet.     name:  name: Krupa WAGNER      Agency: [employer]  Language: Jamaican   Telephone number:   Type of interpretation: Face-to-face, spoken

## 2018-09-04 NOTE — LETTER
September 5, 2018    Terrance Mcwilliams  [address]   [place] [address]    Dear Terrance,    Thank you for getting your care at [facility]. Please see below for your test results.    Resulted Orders   Wet Prep ([facility])   Result Value Ref Range    Yeast Wet Prep None none    Motile Trichomonas Wet Prep Negative Negative    Clue Cells Wet Prep None NONE    WBC WET PREP 2-5 2 - 5    Bacteria Wet Prep Many None    pH Wet Prep <4.5 3.8 - 4.5    Odor Wet Prep None NONE     These are normal. There are 2 more tests I am waiting for - I will write when we have them. I'm so glad you are doing better!    Sincerely,    Zoe Edouard MD

## 2018-09-04 NOTE — LETTER
September 14, 2018      Terrance Mcwilliams  [address]   [place] [address]        Dear Terrance,    Thank you for getting your care at [facility]. Please see below for your test results. Your pap and HPV were normal - your next pap should be in 5 years!    Resulted Orders   Pap imaged thin layer screen with HPV - recommended age 30 - 65 years (select HPV order below)   Result Value Ref Range    PAP NIL     Copath Report         Acc#: [id number]   Signed: 9/10/2018 13:16   MR#: [id number]    SPECIMEN/STAIN PROCESS:  Pap imaged thin layer prep screening (Surepath, FocalPoint with guided   screening)       Pap-Cyto x 1, HPV ordered x 1    SOURCE: Cervical, endocervical  ----------------------------------------------------------------   Pap imaged thin layer prep screening (Surepath, FocalPoint with guided   screening)  SPECIMEN ADEQUACY:  Satisfactory for evaluation.  -Transformation zone component present.    CYTOLOGIC INTERPRETATION:    Negative for intraepithelial lesion or malignancy    Electronically signed by:  Jackie FAIRBANKS, (ASCP)    CLINICAL HISTORY:    Currently not having periods  Irregular Bleeding  Irregular periods, A previous normal pap  Date of Last Pap: 6/11/2018,    Papanicolaou Test Limitations:  Cervical cytology is a screening test with   limited sensitivity; regular  screening is critical for cancer prevention; Pap tests are primarily   effective for the diagnosis/prevention  of  squamous cell carcinoma, not adenocarcinomas or other cancers.  TESTING LAB LOCATION:  [facility] [address] [address], [phone]  Processed and screened at [facility],   [facility]     HPV High Risk Types DNA Cervical   Result Value Ref Range    HPV Source SurePath     HPV 16 DNA Negative NEG^Negative    HPV 18 DNA Negative NEG^Negative    Other HR HPV Negative  NEG^Negative    Final Diagnosis This patient's sample is negative for HPV DNA.       Comment:      This test was developed and its performance characteristics determined by the   [facility]. It   has not been cleared or approved by the FDA. The laboratory is regulated under   CLIA as qualified to perform high-complexity testing. This test is used for   clinical purposes. It should not be regarded as investigational or for   research.  (Note)  METHODOLOGY:  The Roche amber 4800 system uses automated extraction,   simultaneous amplification of HPV (L1 region) and beta-globin,    followed by  real time detection of fluorescent labeled HPV and beta   globin using specific oligonucleotide probes . The test specifically   identifies types HPV 16 DNA and HPV 18 DNA while concurrently   detecting the rest of the high risk types (31, 33, 35, 39, 45, 51,   52, 56, 58, 59, 66 or 68).  COMMENTS:  This test is not intended for use as a screening device   for women under age 30 with normal cervical cytology.  Results should   be correl  ated with cytologic and histologic findings. Close clinical   followup is recommended.      Specimen Description Cervical Cells       Comment:      C18 [id number]   Wet Prep ([facility])   Result Value Ref Range    Yeast Wet Prep None none    Motile Trichomonas Wet Prep Negative Negative    Clue Cells Wet Prep None NONE    WBC WET PREP 2-5 2 - 5    Bacteria Wet Prep Many None    pH Wet Prep <4.5 3.8 - 4.5    Odor Wet Prep None NONE           Sincerely,    Zoe Edouard MD

## 2018-09-04 NOTE — PATIENT INSTRUCTIONS
Appointments   1. CT scan [address] At 1:30 PM  2. Go to [address] [place] [address] for 3:00pm urology appointment with Dr. diego    Abdominal Pain  1. Eat smaller and more frequent meals: nuts, fruits, vegetables, yogurt. Avoid eating around bedtime.   2. Avoid prolonged fasting.  3. Mild to moderate cardio exercise.    Pap Smear  1. Pap completed today at \[facility]". Results via letter.  2. Wet Prep today. Results via letter.  3. Follow-up with me next summer for your routine physical, or sooner if you're not feeling better.

## 2018-09-04 NOTE — PROGRESS NOTES
HPI       Terrance Mcwilliams is a 38 year old  who presents for   Chief Complaint   Patient presents with     Gyn Exam     PAP     Right Upper Quadrant Pain     Pain, after endoscopy,right side pain, below breast,4/10, 2 weeks     Abd Pain  Pt reports that she continues to have left flank pain, but not as severe as before. She's now  having RUQ pain. Pain has been off and on since endoscopy on 08/21/18. Infracostal pain that now moves along the costal margin, with accompanying itching. Feels the pain daily. Hasn't noticed that eating greasy foods exacerbates sx. Stools are soft. Normal colonoscopy and upper endoscopy on 08/21 at the [place] [facility] center, suspected dyspepsia +/- non erosive GERD. Strongly recommended dietary changes. Does not like taking omeprazole, because she worries about her kidneys. She took it once and never since and still has continued to improve. Had questions about hepatitis exposure/immunity status and if she needs to be tested.     [facility]  was used for  this visit.    +++++++    Problem, Medication and Allergy Lists were reviewed and updated if needed..    Patient is an established patient of this clinic..         Review of Systems:   Review of Systems     Positive for vaginal discharge.  Negative for: vaginal itching, dysuria.  No full body itching       See HPI for additional sx.    This document serves as a record of the services and decisions personally performed and made by Zoe Edouard MD. It was created on his/her behalf by Comfort Knight, a trained medical scribe. The creation of this document is based the provider's statements to the medical scribe.  Scribe Comfort Knight 4:12 PM, September 4, 2018       Physical Exam:     Vitals:    09/04/18 1551   BP: 119/81   Pulse: 76   Resp: 18   Temp: 97.9  F (36.6  C)   TempSrc: Oral   SpO2: 100%   Weight: 185 lb 3.2 oz (84 kg)     Body mass index is 29.01 kg/(m^2).  Vitals were reviewed and were normal     Physical  Exam    BMI= Body mass index is 29.01 kg/(m^2).     GENERAL: healthy, alert and no distress  ABDOMEN: soft, no tenderness, no  hepatosplenomegaly, no masses, normal bowel sounds  - normal external genitalia status post type 1 genital cutting, normal cervix, physiologic discharge.  PSYCH: Alert and oriented times 3; speech- coherent , normal rate and volume; affect- normal  LYMPHATICS: ant. cervical- normal, post. cervical- normal, axillary- normal, supraclavicular- normal, inguinal- normal      Results:   Results are ordered and pending    Assessment and Plan   Terrance was seen today for gyn exam and right upper quadrant pain.    Diagnoses and all orders for this visit:    RUQ abdominal pain  - See dyspepsia dx below.    Screening for malignant neoplasm of cervix  -     Pap imaged thin layer screen with HPV - recommended age 30 - 65 years (select HPV order below). Results via letter.  -     HPV High Risk Types DNA Cervical    Dyspepsia poss non erosive GERD  - Recommended:     - Eating smaller and more frequent meals: nuts, fruits, vegetables, yogurt. Avoid eating around bedtime.     -  Avoid prolonged fasting.    -  Mild to moderate cardio exercise.    Vaginal discharge  -     Wet Prep ([facility]). Results via letter.    Kidney stone disease  Longstanding   ? If related to newer development of abd pain   Has appointment this week -reluctant to get CT scan repeated (last one was w/ contrast in ED, has noncontrast scheduled).   Discussed options - she will pursue and follow up with Dr. Ortiz    Medications Discontinued During This Encounter   Medication Reason     omeprazole (PRILOSEC) 20 MG CR capsule      polyethylene glycol (MIRALAX) powder        AVS    Appointments   1. CT scan [address] At 1:30 PM  2. Go to [address] [place] [address] for 3:00pm urology appointment with Dr. ortiz    Abdominal Pain  1. Eat smaller and more frequent meals: nuts, fruits, vegetables, yogurt. Avoid eating around bedtime.   2. Avoid  prolonged fasting.  3. Mild to moderate cardio exercise.    Pap Smear  1. Pap completed today at [facility]. Results via letter.  2. Wet Prep today. Results via letter.  3. Follow-up with me next summer for your routine physical, or sooner if you're not feeling better.    Options for treatment and follow-up care were reviewed with the patient. Terrance Mcwilliams  engaged in the decision making process and verbalized understanding of the options discussed and agreed with the final plan.    The information in this document, created by the medical scribe for me, accurately reflects the services I personally performed and the decisions made by me. I have reviewed and approved this document for accuracy prior to leaving the patient care area.    Zoe Edouard MD  4:09 PM, 09/04/18

## 2018-09-04 NOTE — MR AVS SNAPSHOT
After Visit Summary   9/4/2018    Terrance Mcwilliams    MRN: [id number]           Patient Information     Date Of Birth          1980        Visit Information        Provider Department      9/4/2018 3:40 PM Zoe Edouard MD [facility]        Today's Diagnoses     RUQ abdominal pain    -  1    Screening for malignant neoplasm of cervix        Dyspepsia        Vaginal discharge          Care Instructions    Appointments   1. CT scan [address]. At 1:30 PM  2. Go to [address] [place] [address] for 3:00pm urology appointment with Dr. diego    Abdominal Pain  1. Eat smaller and more frequent meals: nuts, fruits, vegetables, yogurt. Avoid eating around bedtime.   2. Avoid prolonged fasting.  3. Mild to moderate cardio exercise.    Pap Smear  1. Pap completed today at [facility]. Results via letter.  2. Wet Prep today. Results via letter.  3. Follow-up with me next summer for your routine physical, or sooner if you're not feeling better.          Follow-ups after your visit        Your next 10 appointments already scheduled     Sep 07, 2018  1:30 PM CDT   CT ABDOMEN PELVIS W/O CONTRAST with URCT1   [facility], [place], Radiology ([facility])    [address] [place] [address] [phone]           Please bring any scans or X-rays taken at other hospitals, if similar tests were done. Also bring a list of your medicines, including vitamins, minerals and over-the-counter drugs. It is safest to leave personal items at home.  Be sure to tell your doctor:   If you have any allergies.   If there s any chance you are pregnant.   If you are breastfeeding.  How to prepare:   Do not eat or drink for 2 hours before your exam. If you need to take medicine, you may take it with small sips of water. (We may ask you to take liquid medicine as well.)   Please wear loose clothing, such as a sweat suit or jogging clothes. Avoid snaps, zippers and other  metal. We may ask you to undress and put on a hospital gown.  Please arrive 30 minutes early for your CT. Once in the department you might be asked to drink water 15-20 minutes prior to your exam.  If indicated you may be asked to drink an oral contrast in advance of your CT.  If this is the case, the imaging team will let you know or be in contact with you prior to your appointment  Patients over 70 or patients with diabetes or kidney problems:   If you haven t had a blood test (creatinine test) within the last 30 days, the Cardiologist/Radiologist may require you to get this test prior to your exam.  If you have diabetes:   Continue to take your metformin medication on the day of your exam  If you have any questions, please call the Imaging Department where you will have your exam.            Sep 07, 2018  3:00 PM CDT   (Arrive by 2:45 PM)   New Patient Visit with Luisana Ortiz MD   [facility] for Prostate and Urologic Cancers ([facility] [place])    [address] [place] [address] [phone]              Who to contact     Please call your clinic at [phone] to:    Ask questions about your health    Make or cancel appointments    Discuss your medicines    Learn about your test results    Speak to your doctor            Additional Information About Your Visit        Care EveryWhere ID     This is your Care EveryWhere ID. This could be used by other organizations to access your [place] medical records  [id number]        Your Vitals Were     Pulse Temperature Respirations Pulse Oximetry Breastfeeding? BMI (Body Mass Index)    76 97.9  F (36.6  C) (Oral) 18 100% No 29.01 kg/m2       Blood Pressure from Last 3 Encounters:   09/04/18 119/81   07/30/18 110/74   07/25/18 121/85    Weight from Last 3 Encounters:   09/04/18 185 lb 3.2 oz (84 kg)   07/30/18 185 lb 9.6 oz (84.2 kg)   07/24/18 184 lb 3.2 oz (83.6 kg)              We Performed the  Following     HPV High Risk Types DNA Cervical     Pap imaged thin layer screen with HPV - recommended age 30 - 65 years (select HPV order below)     Wet Prep ([facility])          Today's Medication Changes          These changes are accurate as of 9/4/18  4:40 PM.  If you have any questions, ask your nurse or doctor.               Stop taking these medicines if you haven't already. Please contact your care team if you have questions.     omeprazole 20 MG CR capsule   Commonly known as:  priLOSEC   Stopped by:  Zoe Edouard MD           polyethylene glycol powder   Commonly known as:  MIRALAX   Stopped by:  Zoe Edouard MD                    Primary Care Provider Office Phone # Fax #    Zoe Edouard [phone]       [address] [address]        Equal Access to Services     SUE GODOY : Eunice manriqueo Seth, waaxda luqadaha, qaybta kaalmada adeegyada, shelly wei . So [facility] [phone].    ATENCIÓN: Si habla español, tiene a dalal disposición servicios gratuitos de asistencia lingüística. [facility] [phone].    We comply with applicable federal civil rights laws and [place] laws. We do not discriminate on the basis of race, color, national origin, age, disability, sex, sexual orientation, or gender identity.            Thank you!     Thank you for choosing [facility]  for your care. Our goal is always to provide you with excellent care. Hearing back from our patients is one way we can continue to improve our services. Please take a few minutes to complete the written survey that you may receive in the mail after your visit with us. Thank you!             Your Updated Medication List - Protect others around you: Learn how to safely use, store and throw away your medicines at www.disposemymeds.org.          This list is accurate as of 9/4/18  4:40 PM.  Always use your most recent med list.                   Brand Name  Dispense Instructions for use Diagnosis    chlorhexidine 0.12 % solution    PERIDEX          ibuprofen 600 MG tablet    ADVIL/MOTRIN     Take 600 mg by mouth        levonorgestrel-ethinyl estradiol 0.1-20 MG-MCG per tablet    AVIANE,ALESSE,LESSINA    84 tablet    Take 1 tablet by mouth daily    Premature ovarian failure       sennosides 8.6 MG tablet    SENOKOT     Take 2 tablets by mouth daily        TYLENOL PO           vitamin D3 1000 units Caps      Take 1,000 Units by mouth

## 2018-09-06 ENCOUNTER — TELEPHONE (OUTPATIENT)
Dept: UROLOGY | Facility: CLINIC | Age: 38
End: 2018-09-06

## 2018-09-06 NOTE — TELEPHONE ENCOUNTER
[facility] Call Center    Phone Message    May a detailed message be left on voicemail: yes    Reason for Call: Other: Patient is refusing the CT scan but was wondering if she can get an ultrasound instead. Patient would like orders placed for the ultrasound so she can schedule it. Please follow up with patient when orders are in so she can schedule her ultrasound.      Action Taken: Message routed to:  [facility] ([facility] Urology

## 2018-09-06 NOTE — TELEPHONE ENCOUNTER
Patient cancelled her ct does not like ct's  She had one in July wants to use this one and talk with dr diego about getting a different test.. Whit Oliva, SYBIL Staff Nurse

## 2018-09-07 ENCOUNTER — OFFICE VISIT (OUTPATIENT)
Dept: UROLOGY | Facility: CLINIC | Age: 38
End: 2018-09-07
Payer: COMMERCIAL

## 2018-09-07 VITALS
DIASTOLIC BLOOD PRESSURE: 76 MMHG | HEART RATE: 75 BPM | HEIGHT: 67 IN | WEIGHT: 185 LBS | SYSTOLIC BLOOD PRESSURE: 117 MMHG | BODY MASS INDEX: 29.03 KG/M2

## 2018-09-07 DIAGNOSIS — N20.0 CALCULUS OF KIDNEY: Primary | ICD-10-CM

## 2018-09-07 RX ORDER — POLYETHYLENE GLYCOL 3350 17 G/17G
POWDER, FOR SOLUTION ORAL
Refills: 0 | COMMUNITY
Start: 2018-06-11

## 2018-09-07 RX ORDER — TAMSULOSIN HYDROCHLORIDE 0.4 MG/1
CAPSULE ORAL
Refills: 0 | COMMUNITY
Start: 2018-07-02

## 2018-09-07 ASSESSMENT — PAIN SCALES - GENERAL: PAINLEVEL: NO PAIN (0)

## 2018-09-07 NOTE — LETTER
"2018       RE: Terrance Mcwilliams  [address]  [place] [address]     Dear Colleague,    Thank you for referring your patient, Terrance Mcwilliams, to the [facility] UROLOGY AND INST FOR PROSTATE AND UROLOGIC CANCERS at [facility]. Please see a copy of my visit note below.      SUBJECTIVE:                                                      Terrance Mcwilliams is a 38 year old female who comes in for problems related to kidney stones.    Patient presents for evaluation and management of renal calculi. Had evaluation for LUQ pain and was found on imaging to have large left renal/UPJ calculus.   She notes that this stone has been present for years and that she has not had symptoms from it.      We discussed intermittent obstruction, ureteroscopy vs PCNL, timing of procedure (patient would like to go to [facility] in the next month and will be gone for up to year).       Past Medical History:   Diagnosis Date     Kidney stone 10/08       Past Surgical History:   Procedure Laterality Date     C/SECTION, LOW TRANSVERSE  10/09    failure to progress       Family History   Problem Relation Age of Onset     Hypertension Mother      Hyperlipidemia Mother      Diabetes Mother      Cancer Mother      throat     Unknown/Adopted Father       of sickness age 65     Glaucoma No family hx of      Macular Degeneration No family hx of      Breast Cancer No family hx of        Social History   Substance Use Topics     Smoking status: Never Smoker     Smokeless tobacco: Never Used     Alcohol use No         OBJECTIVE:                                                    /76  Pulse 75  Ht 1.702 m (5' 7\")  Wt 83.9 kg (185 lb)  BMI 28.98 kg/m2  Body mass index is 28.98 kg/(m^2).    EXAM:    GENERAL APPEARANCE: healthy, alert and no distress  RESP: negative   CV: negative   Abdomen: Abdomen soft, non-tender  CVAT: absent  SKIN: no suspicious lesions or rashes    IMPRESSION: Kidney " stones    Diagnostic test results:  CT scan abd pelvis reviewed with the patient      ASSESSMENT/PLAN:                                                    No diagnosis found.    Will schedule with first available urologist for ureteroscopy (likely Dr. Matias)       Luisana Ortiz MD  [facility] UROLOGY AND [facility] FOR PROSTATE AND UROLOGIC CANCERS        I spent over 20 minutes with the patient.  Over half this time was spent on counseling for renal calculus   .      Again, thank you for allowing me to participate in the care of your patient.      Sincerely,    Liusana Ortiz MD

## 2018-09-07 NOTE — MR AVS SNAPSHOT
"              After Visit Summary   9/7/2018    Terrance Mcwilliams    MRN: [id number]           Patient Information     Date Of Birth          1980        Visit Information        Provider Department      9/7/2018 2:45 PM Luisana Ortiz MD; [employer] [facility] and [facility] for Prostate and Urologic Cancers        Today's Diagnoses     Calculus of kidney    -  1       Follow-ups after your visit        Your next 10 appointments already scheduled     Sep 27, 2018   Procedure with Tirso Matias MD   [facility], [place], Same Day Surgery (--)    [address]  [place] [address] [phone]              Future tests that were ordered for you today     Open Future Orders        Priority Expected Expires Ordered    Urine Culture Aerobic Bacterial Routine  9/12/2019 9/12/2018            Who to contact     Please call your clinic at [phone] to:    Ask questions about your health    Make or cancel appointments    Discuss your medicines    Learn about your test results    Speak to your doctor            Additional Information About Your Visit        Care EveryWhere ID     This is your Care EveryWhere ID. This could be used by other organizations to access your [place] medical records  [id number]        Your Vitals Were     Pulse Height BMI (Body Mass Index)             75 1.702 m (5' 7\") 28.98 kg/m2          Blood Pressure from Last 3 Encounters:   09/07/18 117/76   09/04/18 119/81   07/30/18 110/74    Weight from Last 3 Encounters:   09/07/18 83.9 kg (185 lb)   09/04/18 84 kg (185 lb 3.2 oz)   07/30/18 84.2 kg (185 lb 9.6 oz)              Today, you had the following     No orders found for display       Primary Care Provider Office Phone # Fax #    Zoe Edouard [phone]       [address] [address]        Equal Access to Services     SUE GODOY AH: Eunice Ann, sha aguilar, shelly sterling " ryan wootensaqibterri galdamezaataylor ah. So [facility] [phone].    ATENCIÓN: Si satish sin, tiene a dalal disposición servicios gratuitos de asistencia lingüística. Toy al [phone].    We comply with applicable federal civil rights laws and [place] laws. We do not discriminate on the basis of race, color, national origin, age, disability, sex, sexual orientation, or gender identity.            Thank you!     Thank you for choosing [facility] UROLOGY AND [facility] FOR PROSTATE AND UROLOGIC CANCERS  for your care. Our goal is always to provide you with excellent care. Hearing back from our patients is one way we can continue to improve our services. Please take a few minutes to complete the written survey that you may receive in the mail after your visit with us. Thank you!             Your Updated Medication List - Protect others around you: Learn how to safely use, store and throw away your medicines at www.disposemymeds.org.          This list is accurate as of 9/7/18 11:59 PM.  Always use your most recent med list.                   Brand Name Dispense Instructions for use Diagnosis    chlorhexidine 0.12 % solution    PERIDEX          ibuprofen 600 MG tablet    ADVIL/MOTRIN     Take 600 mg by mouth        levonorgestrel-ethinyl estradiol 0.1-20 MG-MCG per tablet    AVIANE,ALESSE,LESSINA    84 tablet    Take 1 tablet by mouth daily    Premature ovarian failure       omeprazole 20 MG CR capsule    priLOSEC          polyethylene glycol powder    MIRALAX/GLYCOLAX          sennosides 8.6 MG tablet    SENOKOT     Take 2 tablets by mouth daily        tamsulosin 0.4 MG capsule    FLOMAX          TYLENOL PO           vitamin D3 1000 units Caps      Take 1,000 Units by mouth

## 2018-09-07 NOTE — PROGRESS NOTES
"  SUBJECTIVE:                                                      Terrance Mcwilliams is a 38 year old female who comes in for problems related to kidney stones.    Patient presents for evaluation and management of renal calculi. Had evaluation for LUQ pain and was found on imaging to have large left renal/UPJ calculus.   She notes that this stone has been present for years and that she has not had symptoms from it.      We discussed intermittent obstruction, ureteroscopy vs PCNL, timing of procedure (patient would like to go to [facility] in the next month and will be gone for up to year).             ROS:  CONSTITUTIONAL:NEGATIVE for fever, chills, change in weight  INTEGUMENTARY/SKIN: NEGATIVE for worrisome rashes, moles or lesions  EYES: NEGATIVE for vision changes or irritation  ENT/MOUTH: NEGATIVE for ear, mouth and throat problems  RESP:NEGATIVE for significant cough or SOB  CV: NEGATIVE for chest pain, palpitations or peripheral edema  GI: NEGATIVE for nausea, abdominal pain, heartburn, or change in bowel habits  : no unusual vaginal symptoms  MUSCULOSKELETAL: NEGATIVE for significant arthralgias or myalgia  PSYCHIATRIC: NEGATIVE for changes in mood or affect    Past Medical History:   Diagnosis Date     Kidney stone 10/08       Past Surgical History:   Procedure Laterality Date     C/SECTION, LOW TRANSVERSE  10/09    failure to progress       Family History   Problem Relation Age of Onset     Hypertension Mother      Hyperlipidemia Mother      Diabetes Mother      Cancer Mother      throat     Unknown/Adopted Father       of sickness age 65     Glaucoma No family hx of      Macular Degeneration No family hx of      Breast Cancer No family hx of        Social History   Substance Use Topics     Smoking status: Never Smoker     Smokeless tobacco: Never Used     Alcohol use No         OBJECTIVE:                                                    /76  Pulse 75  Ht 1.702 m (5' 7\")  Wt 83.9 kg (185 lb)  " BMI 28.98 kg/m2  Body mass index is 28.98 kg/(m^2).    EXAM:    GENERAL APPEARANCE: healthy, alert and no distress  RESP: negative   CV: negative   Abdomen: Abdomen soft, non-tender  CVAT: absent  SKIN: no suspicious lesions or rashes    IMPRESSION: Kidney stones    Diagnostic test results:  CT scan abd pelvis reviewed with the patient      ASSESSMENT/PLAN:                                                    No diagnosis found.    Will schedule with first available urologist for ureteroscopy (likely Dr. Matias)       Luisana Ortiz MD  [facility] UROLOGY AND [facility] FOR PROSTATE AND UROLOGIC CANCERS        I spent over 20 minutes with the patient.  Over half this time was spent on counseling for renal calculus   .      Answers for HPI/ROS submitted by the patient on 9/7/2018   General Symptoms: No  Skin Symptoms: No  HENT Symptoms: No  EYE SYMPTOMS: No  HEART SYMPTOMS: No  LUNG SYMPTOMS: No  INTESTINAL SYMPTOMS: No  URINARY SYMPTOMS: No  GYNECOLOGIC SYMPTOMS: No  BREAST SYMPTOMS: No  SKELETAL SYMPTOMS: No  BLOOD SYMPTOMS: No  NERVOUS SYSTEM SYMPTOMS: No  MENTAL HEALTH SYMPTOMS: No

## 2018-09-10 LAB
COPATH REPORT: NORMAL
PAP: NORMAL

## 2018-09-11 LAB
FINAL DIAGNOSIS: NORMAL
HPV HR 12 DNA CVX QL NAA+PROBE: NEGATIVE
HPV16 DNA SPEC QL NAA+PROBE: NEGATIVE
HPV18 DNA SPEC QL NAA+PROBE: NEGATIVE
SPECIMEN DESCRIPTION: NORMAL
SPECIMEN SOURCE CVX/VAG CYTO: NORMAL

## 2018-09-12 ENCOUNTER — HOSPITAL ENCOUNTER (OUTPATIENT)
Facility: CLINIC | Age: 38
End: 2018-09-12
Attending: UROLOGY | Admitting: UROLOGY
Payer: COMMERCIAL

## 2018-09-12 ENCOUNTER — TELEPHONE (OUTPATIENT)
Dept: UROLOGY | Facility: CLINIC | Age: 38
End: 2018-09-12

## 2018-09-12 DIAGNOSIS — N20.0 CALCULUS OF KIDNEY: Primary | ICD-10-CM

## 2018-09-12 NOTE — TELEPHONE ENCOUNTER
Patient is scheduled for surgery with Dr. Matias      Spoke or left message with:Terrance    Date of Surgery: 9/27/18    Location: [facility] OR    Informed patient they will need an adult  yes    Pre-op with surgeon (if applicable): n/a    H&P: Scheduled with pcp    Additional imaging/appointments: n/a    Surgery packet: mailed 9/12/18     Additional comments: n/a

## 2019-10-24 ENCOUNTER — OFFICE VISIT (OUTPATIENT)
Dept: FAMILY MEDICINE | Facility: CLINIC | Age: 39
End: 2019-10-24
Payer: MEDICAID

## 2019-10-24 VITALS
TEMPERATURE: 98.3 F | RESPIRATION RATE: 16 BRPM | WEIGHT: 187.4 LBS | SYSTOLIC BLOOD PRESSURE: 118 MMHG | OXYGEN SATURATION: 96 % | DIASTOLIC BLOOD PRESSURE: 80 MMHG | BODY MASS INDEX: 29.41 KG/M2 | HEIGHT: 67 IN | HEART RATE: 76 BPM

## 2019-10-24 DIAGNOSIS — N20.0 CALCULUS OF KIDNEY: ICD-10-CM

## 2019-10-24 DIAGNOSIS — K62.5 RECTAL BLEEDING: Primary | ICD-10-CM

## 2019-10-24 DIAGNOSIS — R10.31 ABDOMINAL PAIN, RIGHT LOWER QUADRANT: ICD-10-CM

## 2019-10-24 DIAGNOSIS — K59.04 CHRONIC IDIOPATHIC CONSTIPATION: ICD-10-CM

## 2019-10-24 ASSESSMENT — MIFFLIN-ST. JEOR: SCORE: 1554.73

## 2019-10-24 NOTE — PROGRESS NOTES
HPI       Terrance Mcwilliams is a 38 yo F with a PMH of renal calculi who presents to clinic today with left sided abdominal pain. She has noticed the abdominal pain worsening over the past year. It is localized to her left lower abdomen. She reports no radiation of pain. She has been in [place] since September 2018 and returned about 2 weeks ago.     Terrance also reported that she has also been noticing some blood in the toilet bowl when she uses the bathroom. This has been happening for the past five months as well. Her constipation was controlled in [place] and she did not have any significant complaints about it.  She is unsure if her symptoms are related to her existing kidney stone or if this is a new problem.     Concerns today:   ABDOMINAL   Pain     Onset: Three to four years ago- has been more noticeable over the past year     Description:   Character: Dull ache and Fullness  Location: right lower quadrant left lower quadrant  Radiation: None    Intensity: moderate    Progression of Symptoms:  same and constant    Accompanying Signs & Symptoms:  Fever/Chills?: No   Gas/Bloating: No   Dysuria:No   Hematuria: No   Nausea: No   Vomiting: No   Diarrhea:No   Constipation: YES has been an chronic problem for a couple years now- does not take medications to help     History:           Trauma: No   Previous similar pain: No    Previous tests done: CT last year- known renal calculus    Precipitating factors:   Does the pain change with:     Food?: no     BM?: no     Urination:no     What makes it better?:  Drinking a lot of water and laying down     Therapies Tried and outcome: Tried tyelenol and no relief     LMP:  not applicable    A Paraguayan  was used for  this visit.     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     Vitamin D deficiency     Plantar warts     Older than  stated age ( )     Hyperopic astigmatism of both eyes     Presbyopia     Patellofemoral pain syndrome of left knee     Premature ovarian failure     Calculus of kidney       Past Medical History:   Diagnosis Date     Kidney stone 10/08        Family History   Problem Relation Age of Onset     Hypertension Mother      Hyperlipidemia Mother      Diabetes Mother      Cancer Mother         throat     Unknown/Adopted Father          of sickness age 65     Glaucoma No family hx of      Macular Degeneration No family hx of      Breast Cancer No family hx of               Review of Systems:     Review of Systems:  CONSTITUTIONAL: NEGATIVE for fever, chills, change in weight  INTEGUMENTARY/SKIN: NEGATIVE for worrisome rashes, moles or lesions  EYES: NEGATIVE for vision changes or irritation  ENT/MOUTH: NEGATIVE for ear, mouth and throat problems  RESP: NEGATIVE for significant cough or SOB  CV: NEGATIVE for chest pain, palpitations or peripheral edema  GI: NEGATIVE for nausea,heartburn, or change in bowel habits, POSITIVE for left lower abdominal pain   : NEGATIVE for frequency, dysuria, or hematuria  NEURO: NEGATIVE for weakness, dizziness or paresthesias  PSYCHIATRIC: NEGATIVE for changes in mood or affect  Sleep:   Do you snore most or the night (as reported by a family member)? No  Do you feel sleepy or extremely tired during most of the day? No             Social History     Social History     Socioeconomic History     Marital status:      Spouse name: Not on file     Number of children: 0     Years of education: Not on file     Highest education level: Not on file   Occupational History     Employer: [employer]   Social Needs     Financial resource strain: Not on file     Food insecurity:     Worry: Not on file     Inability: Not on file     Transportation needs:     Medical: Not on file     Non-medical: Not on file   Tobacco Use     Smoking status: Never Smoker     Smokeless tobacco: Never Used  "  Substance and Sexual Activity     Alcohol use: No     Drug use: No     Sexual activity: Yes     Partners: Male     Comment: none   Lifestyle     Physical activity:     Days per week: Not on file     Minutes per session: Not on file     Stress: Not on file   Relationships     Social connections:     Talks on phone: Not on file     Gets together: Not on file     Attends Sikhism service: Not on file     Active member of club or organization: Not on file     Attends meetings of clubs or organizations: Not on file     Relationship status: Not on file     Intimate partner violence:     Fear of current or ex partner: Not on file     Emotionally abused: Not on file     Physically abused: Not on file     Forced sexual activity: Not on file   Other Topics Concern     Not on file   Social History Narrative    Lives with  and daughter. 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     Mom lives in [facility], has throat cancer.                               Physical Exam:     Vitals: Blood Pressure 118/80   Pulse 76   Temperature 98.3  F (36.8  C) (Oral)   Respiration 16   Height 1.689 m (5' 6.5\")   Weight 85 kg (187 lb 6.4 oz)   Oxygen Saturation 96%   Body Mass Index 29.79 kg/m    BMI= Body mass index is 29.79 kg/m .   GENERAL: healthy, alert and no distress  RESP: lungs clear to auscultation - no rales, no rhonchi, no wheezes  CV: regular rates and rhythm, normal S1 S2, no S3 or S4 and no murmur, no click or rub   ABDOMEN: soft, some tenderness to palpation in the left lower quadrant and right lower quadrant, no  hepatosplenomegaly, no masses, normal bowel sounds, no rebound tenderness  BACK: no CVA tenderness, no paralumbar tenderness  RECTAL- female: no masses, no hemorrhoids  PSYCH: Alert and oriented times 3; speech- coherent , normal rate and volume; able to articulate logical thoughts, able to abstract reason, no tangential thoughts, no hallucinations or delusions, affect- normal    Assessment and Plan   Ossob was seen today " for physical.    Diagnoses and all orders for this visit:    Rectal bleeding  Abdominal pain, right lower quadrant  -     GASTROENTEROLOGY ADULT REF CONSULT ONLY  -      : Sign Language or Oral - 68-82 minutes    Chronic idiopathic constipation  miralax    Calculus of kidney  -     CT Abdomen Pelvis w/o Contrast; Future  She will follow up with urology for - for poss procedure      Terrance is a 38 yo F with a past medical history of renal calculus who was seen today for evaluation of her left lower quadrant abdominal pain. She reported bright red blood per rectum as well as constipation since coming back from her trip. She is also concerned about her renal calculus. The differential for her abdominal pain and bleeding includes constipation with internal hemorrhoids, constipation with diverticulosis and bleeding, and pain from her renal calculus.     On exam, she was tender in the LLQ and a stool burden was noted. Additionally, the pt said she has been feeling more constipated and reports she has Independence type 1 stool most commonly. She did not report any pain on stooling and did report bright red blood per rectum for a few months. On rectal exam, no external or internal hemorrhoids were visualized. This makes this diagnosis less likely.     Her renal calculus, which was document in a CT ordered on 7/25/18, is located on the L side. She was scheduled for a stenting procedure last year but her appt with urology was cancelled because she had family matters to attend to. It is likely her pain is coming from this renal calculus. Diverticulosis remains on the differential but is less likely.     Plan:   Pt does not want to try Miralax for constipation relief  Monitor any changes in the renal calculus and other etiologies of her pain with a CT abdomen without contrast.   GI  and urology referral     Options for treatment and follow-up care were reviewed with the patient . Terrance Mcwilliams and/or guardian engaged in  the decision making process and verbalized understanding of the options discussed and agreed with the final plan.    Varun Bey, MS3

## 2019-10-24 NOTE — NURSING NOTE
Due to patient being non-English speaking/uses sign language, an  was used for this visit. Only for face-to-face interpretation by an external agency, date and length of interpretation can be found on the scanned worksheet.     name: Rachel Joceline  Language: Czech  Agency: [employer]  Phone number: [phone]  Type of interpretation: Face-to-face, spoken    Law YONATHAN Antunez

## 2019-10-29 ENCOUNTER — HOSPITAL ENCOUNTER (OUTPATIENT)
Dept: CT IMAGING | Facility: CLINIC | Age: 39
Discharge: HOME | End: 2019-10-29
Attending: FAMILY MEDICINE | Admitting: FAMILY MEDICINE
Payer: MEDICAID

## 2019-10-29 DIAGNOSIS — N20.0 CALCULUS OF KIDNEY: ICD-10-CM

## 2019-10-29 PROCEDURE — T1013 SIGN LANG/ORAL INTERPRETER: HCPCS | Mod: U3

## 2019-10-29 PROCEDURE — 74176 CT ABD & PELVIS W/O CONTRAST: CPT

## 2019-10-29 NOTE — PROGRESS NOTES
Preceptor Attestation:  I was present with the medical student who participated in the service and in the documentation of this note. I have verified the history and personally performed the physical exam and medical decision making. I have verified the content of the note, which accurately reflects my assessment of the patient and the plan of care.   Supervising Physician:  Zoe Edouard MD.

## 2019-10-30 ENCOUNTER — TELEPHONE (OUTPATIENT)
Dept: FAMILY MEDICINE | Facility: CLINIC | Age: 39
End: 2019-10-30

## 2019-10-30 NOTE — TELEPHONE ENCOUNTER
"Called patient to relay results and provider's message, all pertinent information given, per PCP, \"Please call Ossob to inform of results. She has known kidney stones, but a surprise finding of infiltrate in the left lower lung - could be pneumonia. She should come in for appointment this week to discuss and for  Evaluation\", MD kimberly.    Author offered patient for appointment to come to clinic for evaluation  this week, on 10/31/2019 and 11/01/2019, patient declined-reasoning-\"started new employment-having schedule restriction\", offered work excuse letter, patient declined. Notified to seek urgent medical care, if having any acute abnormal symptoms, patient verbalized understanding, next week appointment scheduled, per patient preference, message routed to PCP for additional advisement if appropriate, will continue to follow up.    Casey Marcos RN    "

## 2019-10-31 ENCOUNTER — OFFICE VISIT (OUTPATIENT)
Dept: FAMILY MEDICINE | Facility: CLINIC | Age: 39
End: 2019-10-31
Payer: MEDICAID

## 2019-10-31 VITALS
HEART RATE: 70 BPM | SYSTOLIC BLOOD PRESSURE: 119 MMHG | DIASTOLIC BLOOD PRESSURE: 85 MMHG | OXYGEN SATURATION: 97 % | RESPIRATION RATE: 16 BRPM | WEIGHT: 185 LBS | TEMPERATURE: 98.5 F | BODY MASS INDEX: 29.41 KG/M2

## 2019-10-31 DIAGNOSIS — J18.9 PNEUMONIA OF LEFT LOWER LOBE DUE TO INFECTIOUS ORGANISM: Primary | ICD-10-CM

## 2019-10-31 RX ORDER — DOXYCYCLINE HYCLATE 100 MG
100 TABLET ORAL 2 TIMES DAILY
Qty: 10 TABLET | Refills: 0 | Status: SHIPPED | OUTPATIENT
Start: 2019-10-31 | End: 2019-11-05

## 2019-10-31 NOTE — PROGRESS NOTES
Preceptor Attestation:   Patient seen, evaluated and discussed with the resident. I have verified the content of the note, which accurately reflects my assessment of the patient and the plan of care.   Supervising Physician:  Juan Galdamez MD

## 2019-10-31 NOTE — LETTER
[facility]   [address],  [address]  [facility] [address]  Phone: [phone]  Fax: [phone]        10/31/2019    Terrance Mcwilliams  [address]   [facility] [address]  [phone] (home)     :     1980      To Whom it May Concern:    The patient was seen in our clinic 10/31/2019 for an illness, she is being treated but should be excused from work 10/31/2019- 2019.    Please contact me for questions or concerns.    Sincerely,    Dorothea Dumont MD

## 2019-10-31 NOTE — PROGRESS NOTES
HPI       Terrance Mcwilliams is a 39 year old  who presents for   Chief Complaint   Patient presents with     possible pneumonia found on CT scan     6 days ago, had cold with tactile fever, 2 days ago developed worsening with productive cough, green sputum, improving. CT same day was performed to evaluate for stone, but also showed Lower lobe infiltrate. She was called regarding this result. Continues to have productive cough, tactile fevers. No diarrhea, abdominal pain, dysuria.     6 month old niece has pneumonia, spending time with her.     A Entegrion  was used for  this visit.    +++++++    Problem, Medication and Allergy Lists were reviewed and updated if needed..    Patient is an established patient of this clinic..         Review of Systems:   Review of Systems  6 system ROS negative other than as noted in HPI       Physical Exam:     Vitals:    10/31/19 1133   BP: 119/85   Pulse: 70   Resp: 16   Temp: 98.5  F (36.9  C)   TempSrc: Oral   SpO2: 97%   Weight: 83.9 kg (185 lb)     Body mass index is 29.41 kg/m .  Vitals were reviewed and were normal     Physical Exam  Vitals signs and nursing note reviewed.   Constitutional:       General: She is not in acute distress.     Appearance: She is well-developed.   HENT:      Head: Normocephalic and atraumatic.      Mouth/Throat:      Mouth: Mucous membranes are moist.   Eyes:      Extraocular Movements: Extraocular movements intact.   Neck:      Musculoskeletal: Normal range of motion.   Cardiovascular:      Rate and Rhythm: Normal rate and regular rhythm.      Heart sounds: No murmur.   Pulmonary:      Effort: Pulmonary effort is normal. No respiratory distress.      Comments: + crackles LLL  Musculoskeletal: Normal range of motion.   Skin:     General: Skin is warm and dry.   Neurological:      Mental Status: She is alert and oriented to person, place, and time.         Results:   10/29/19 CT AP:  IMPRESSION:   1. 1.4 cm stone in the left renal pelvis  and 5 mm nonobstructing stone  in the left.  2. Left lower lobe infiltrate.    Assessment and Plan        1. Pneumonia of left lower lobe due to infectious organism (H)  Vitals stable, afebrile and oxygenating well. Does have crackles on exam and CT findings consistent with lobar pna. Will treat with doxy for total of 5 days, return to clinic if not improving.   - doxycycline hyclate (VIBRA-TABS) 100 MG tablet; Take 1 tablet (100 mg) by mouth 2 times daily for 5 days  Dispense: 10 tablet; Refill: 0       There are no discontinued medications.    Options for treatment and follow-up care were reviewed with the patient. Terrance Mcwilliams  engaged in the decision making process and verbalized understanding of the options discussed and agreed with the final plan.    Dorothea Dumont MD

## 2020-01-07 ENCOUNTER — OFFICE VISIT (OUTPATIENT)
Dept: FAMILY MEDICINE | Facility: CLINIC | Age: 40
End: 2020-01-07
Payer: COMMERCIAL

## 2020-01-07 VITALS
BODY MASS INDEX: 27.77 KG/M2 | HEIGHT: 68 IN | SYSTOLIC BLOOD PRESSURE: 113 MMHG | DIASTOLIC BLOOD PRESSURE: 71 MMHG | RESPIRATION RATE: 18 BRPM | OXYGEN SATURATION: 97 % | HEART RATE: 67 BPM | WEIGHT: 183.2 LBS | TEMPERATURE: 97.5 F

## 2020-01-07 DIAGNOSIS — M25.374 FOOT JOINT INSTABILITY, RIGHT: Primary | ICD-10-CM

## 2020-01-07 DIAGNOSIS — M77.51 TENDINITIS OF RIGHT FOOT: ICD-10-CM

## 2020-01-07 ASSESSMENT — PAIN SCALES - GENERAL: PAINLEVEL: MODERATE PAIN (4)

## 2020-01-07 ASSESSMENT — MIFFLIN-ST. JEOR: SCORE: 1554.49

## 2020-01-07 NOTE — PROGRESS NOTES
Preceptor Attestation:   Patient seen, evaluated and discussed with the resident. I have verified the content of the note, which accurately reflects my assessment of the patient and the plan of care.   Supervising Physician:  Jose Cruz Birmingham MD.

## 2020-01-07 NOTE — PROGRESS NOTES
DME INFORMATION  Ankle lace up T strap  Qty: 1  Size: Medium  REF# : [id number]  Manf.: [employer].  Lot #: [id number]  Nidhi Fan MA on 1/7/2020 at 12:47 PM

## 2020-01-07 NOTE — PATIENT INSTRUCTIONS
Here is the plan from today's visit    1. Foot joint instability, right  You can take Tylenol 500 mg tablets (take up to 2 of them up to 3 times daily).     You can also take ibuprofen 600 mg (3 of the over the counter tablets) 3-4 times daily.     - PODIATRY/FOOT & ANKLE SURGERY REFERRAL - INTERNAL      Please call or return to clinic if your symptoms don't go away.    Follow up plan  Please make a clinic appointment for follow up with your primary physician Zoe Edouard MD in 1 month for recheck of pain.    Thank you for coming to [facility] today.  Lab Testing:  **If you had lab testing today and your results are reassuring or normal they will be mailed to you or sent through dianboom within 7 days.   **If the lab tests need quick action we will call you with the results.  The phone number we will call with results is # [phone] (home) . If this is not the best number please call our clinic and change the number.  Medication Refills:  If you need any refills please call your pharmacy and they will contact us.   If you need to  your refill at a new pharmacy, please contact the new pharmacy directly. The new pharmacy will help you get your medications transferred faster.   Scheduling:  If you have any concerns about today's visit or wish to schedule another appointment please call our office during normal business hours [phone] (8-5:00 M-F)  If a referral was made to a [facility] and you don't get a call from central scheduling please call [phone].  If a Mammogram was ordered for you at [facility] call [phone] to schedule or change your appointment.  If you had an XRay/CT/Ultrasound/MRI ordered the number is [phone] to schedule or change your radiology appointment.   Medical Concerns:  If you have urgent medical concerns please call [phone] at any time of the day.    Erin Blanco MD    Podiatry referral  [phone]  Zoe Tabares sent to  Leanna Morales,   We reached out to the pt twice and LVM in January, We also sent a letter to the pt and have not heard back to schedule.   I hope that helps.   Thanks,   Zoe

## 2020-01-07 NOTE — PROGRESS NOTES
ALIYAH       Terrance Mcwilliams is a 40 year old  who presents for   Chief Complaint   Patient presents with     Musculoskeletal Problem     x 5 months, Right leg, swelling on foot and ankle, pain rate 4/10     Patient has a past medical history of patellofemoral pain syndrome. She presents today to talk about pain she has been having in the foot, starting in the web space between the 1st and second digits which extends up to the anterior and superior medial malleolus, up to the knee at times on the right lower limb. This pain has associated swelling of the web space and the anterior superior medial malleolus. Pain first started about 5 months ago, and she notices it primarily after ambulation for multiple hours, such as at work, especially by 8 hours of work. She has no associated numbness, tingling, or weakness. She denies any injuries. She also denies any history of blood clots, current shortness of breath or cough, chest pain, current or recent pregnancy, or recent surgeries. She has not taken any medications for this.     As a separate issue, she has medial knee pain bilaterally, which has been ongoing for multiple years.     A IdeaString  was used for  this visit.    +++++++      Problem, Medication and Allergy Lists were   reviewed and updated if needed.     Patient Active Problem List    Diagnosis Date Noted     Patellofemoral pain syndrome of left knee 2018     Priority: Medium     Premature ovarian failure 2018     Priority: Medium     Stay on OCP's til 45-50 for bone health       Calculus of kidney 2018     Priority: Medium     Hyperopic astigmatism of both eyes 2015     Priority: Medium     Presbyopia 2015     Priority: Medium     Vitamin D deficiency 2014     Priority: Medium     20 (2013)       Plantar warts 2014     Priority: Medium     Older than stated age ( 1973) 2014     Priority: Medium     Recurrent miscarriages due to luteal phase  "defect, not pregnant 08/09/2013     Priority: Medium     Language barrier, cultural differences 04/17/2009     Priority: Medium     Omani speaking  Female circ- admits small spec       PPD positive, treated 04/17/2009     Priority: Medium     2001- treated x 2-4 months, neg CXR, needs FU PP___  (Problem list name updated by automated process. Provider to review and confirm.)           Current Outpatient Medications   Medication Sig Dispense Refill     Acetaminophen (TYLENOL PO)        levonorgestrel-ethinyl estradiol (AVIANE,ALESSE,LESSINA) 0.1-20 MG-MCG per tablet Take 1 tablet by mouth daily 84 tablet 3     omeprazole (PRILOSEC) 20 MG CR capsule   0     polyethylene glycol (MIRALAX/GLYCOLAX) powder   0     tamsulosin (FLOMAX) 0.4 MG capsule   0       No Known Allergies.    Patient is an established patient of this clinic..         Review of Systems:   Review of Systems   Constitutional: Negative for chills and fever.   Respiratory: Negative for cough and shortness of breath.    Cardiovascular: Negative for chest pain.   Musculoskeletal: Positive for arthralgias. Negative for gait problem, joint swelling and myalgias.   Neurological: Negative for weakness and numbness.            Physical Exam:     Vitals:    01/07/20 1147   BP: 113/71   Pulse: 67   Resp: 18   Temp: 97.5  F (36.4  C)   TempSrc: Oral   SpO2: 97%   Weight: 83.1 kg (183 lb 3.2 oz)   Height: 1.727 m (5' 8\")     Body mass index is 27.86 kg/m .  Vitals were reviewed and were normal     Physical Exam  Constitutional:       Appearance: Normal appearance.   HENT:      Head: Normocephalic and atraumatic.   Eyes:      Extraocular Movements: Extraocular movements intact.      Conjunctiva/sclera: Conjunctivae normal.   Neck:      Musculoskeletal: Normal range of motion.   Cardiovascular:      Rate and Rhythm: Normal rate.      Pulses:           Dorsalis pedis pulses are 2+ on the right side and 2+ on the left side.        Posterior tibial pulses are 2+ on the " right side and 2+ on the left side.   Pulmonary:      Effort: Pulmonary effort is normal.   Musculoskeletal:      Right ankle: She exhibits decreased range of motion. She exhibits no swelling and no deformity.        Feet:    Feet:      Right foot:      Skin integrity: Skin integrity normal.      Left foot:      Skin integrity: Skin integrity normal.      Comments: Circled areas with tenderness and mild swelling. No abnormal masses felt. Midfoot shifts medially mid to late step.   Skin:     General: Skin is warm and dry.      Findings: No erythema or rash.          Neurological:      General: No focal deficit present.      Mental Status: She is alert and oriented to person, place, and time.      Sensory: No sensory deficit.             Results:   No testing ordered today    Assessment and Plan        Terrance was seen today for musculoskeletal problem.    Diagnoses and all orders for this visit:    Foot joint instability, right  Tendonitis of the right foot  Patient has had 5 months of worsening dorsal foot pain in the web space between the 1st and 2nd digits with swelling, radiating up to the anterior/superior medial malleolus, where she also has swelling and pain. She has not tried any therapies. I have low suspicion for DVT due to location of pain. Julianna's is negative. I also have low suspicion for fracture, as she has no bony tenderness. In addition, arthritis is possible but less likely due to absence joint line tenderness of the foot and ankle. I suspect this may be a tendonitis/tendonopathy related to foot instability and medial shift of the midfoot. She tried a lace-up ankle brace today, and feels this does provide some stability for her. She is also given a referral to podiatry for consideration of individualized shoe insert or else as they see appropriate. She is encouraged to use tylenol and ibuprofen as needed for pain, including using these to help prevent onset of pain, getting doses prior to work, and  again 6-8 hours into work.   -     PODIATRY/FOOT & ANKLE SURGERY REFERRAL - INTERNAL  -     Lace-up Ankle Brace         There are no discontinued medications.    Options for treatment and follow-up care were reviewed with the patient. Terrance Mcwilliams  engaged in the decision making process and verbalized understanding of the options discussed and agreed with the final plan.    Erin Blanco MD

## 2020-01-07 NOTE — NURSING NOTE
Due to patient being non-English speaking/uses sign language, an  was used for this visit. Only for face-to-face interpretation by an external agency, date and length of interpretation can be found on the scanned worksheet.     name: Rachel Car  Language: Niuean  Agency: [employer]  Phone number:   Type of interpretation: Face-to-face, spoken    Nidhi Fan MA on 1/7/2020 at 11:47 AM

## 2020-01-08 ASSESSMENT — ENCOUNTER SYMPTOMS
SHORTNESS OF BREATH: 0
WEAKNESS: 0
MYALGIAS: 0
ARTHRALGIAS: 1
JOINT SWELLING: 0
NUMBNESS: 0
CHILLS: 0
FEVER: 0
COUGH: 0

## 2020-01-14 ENCOUNTER — OFFICE VISIT (OUTPATIENT)
Dept: FAMILY MEDICINE | Facility: CLINIC | Age: 40
End: 2020-01-14
Payer: COMMERCIAL

## 2020-01-14 VITALS
OXYGEN SATURATION: 98 % | WEIGHT: 185.2 LBS | TEMPERATURE: 98.2 F | DIASTOLIC BLOOD PRESSURE: 86 MMHG | HEIGHT: 67 IN | BODY MASS INDEX: 29.07 KG/M2 | RESPIRATION RATE: 16 BRPM | HEART RATE: 75 BPM | SYSTOLIC BLOOD PRESSURE: 128 MMHG

## 2020-01-14 DIAGNOSIS — M77.51 BURSITIS OF RIGHT FOOT: Primary | ICD-10-CM

## 2020-01-14 DIAGNOSIS — R10.32 ABDOMINAL PAIN, LEFT LOWER QUADRANT: ICD-10-CM

## 2020-01-14 ASSESSMENT — MIFFLIN-ST. JEOR: SCORE: 1547.69

## 2020-01-14 ASSESSMENT — PAIN SCALES - GENERAL: PAINLEVEL: NO PAIN (0)

## 2020-01-14 NOTE — PROGRESS NOTES
HPI   Terrance Mcwilliams is a 40 year old  who presents for   Chief Complaint   Patient presents with     Medication Follow-up     Patient is complaining of both lower leg pain for x2year and she is here for med followup      [facility]  Reports she's here to follow up for side pain, sxs have not changed since last visit.    Knee Pain  Has bilateral knee pain, but the right knee pain extends down to the remainder of the right lower extremity. Has had knee pain for the last two years, but sxs have worsened over the last 6 months. Pain is worse with prolonged standing, which she does frequently at work.    Foot pain  Wants a second opinion from me since last visit. Tender area lateral ankle with swelling - painful to touch. Complete hx documented by Dr. Blanco 1/7, pls see note - unchanged.     GI  Bright red stools have resolved since last visit, but still has constipation. Clarifies that rectal bleeding was once so probably doesn't need to see GI for this. The last time she used MiraLax was one year ago, doesn't remember how many capfuls she used, but states that it did not work with softening stools. Thinks constipation is worse with foods made from white flour. Is seeing nephrology for kidney stones. Is traveling in Feb. 2020 to [facility] for a couple of years.      A [facility]  was used for  this visit.    +++++++    Problem, Medication and Allergy Lists were reviewed and updated if needed.    Patient is an established patient of this clinic.         Review of Systems:   Review of Systems    Positive for: bilateral knee pain, right leg pain, side pain, occasional constipation, hard stools,    Negative for: bright red stools, red foot, heat in the foot, bruising, shooting pain     See HPI for additional sxs    This document serves as a record of the services and decisions personally performed and made by Zoe Edouard MD. It was createdon his/her behalf by Comfort Knight, a trained medical scribe. The creation  "of this document is based the provider's statements to the medical scribe.  Andrade Knight 3:12 PM, January 14, 2020       Physical Exam:     Vitals:    01/14/20 1456   BP: 128/86   Pulse: 75   Resp: 16   Temp: 98.2  F (36.8  C)   TempSrc: Oral   SpO2: 98%   Weight: 84 kg (185 lb 3.2 oz)   Height: 1.702 m (5' 7\")     Body mass index is 29.01 kg/m .  Vitals were reviewed and were normal     Physical Exam  BMI= Body mass index is 29.01 kg/m .   GENERAL: healthy, alert and no distress  ABDOMEN: soft, no hepatosplenomegaly, normal bowel sounds, pain in the bilateral lower quadrants, linear scars in a pattern across the upper and mid abd that she reports if from a traditional healing.  MS: extremities- Non tender metatarsal heads, non tender between first and second metatarsal heads. Spongy area over the anterior tibiotalar joint, no overlying skin changes, no heat. POCUS shows ovoid fluid collection inferior and anterior to the right medial tibiotalar joint line in the area of spongy soft tissue findings, confirmed in two views.   Knees tender to the medial joint line bilaterally.  PSYCH: affect- normal    Results:   No testing ordered today    Assessment and Plan   Terrance was seen today for medication follow-up.    Diagnoses and all orders for this visit:    Bursitis of right foot  Circumscribed fluid collection in exact area of tenderness  She prefers to treat conservatively and podiatry if not better  Abdominal pain, left lower quadrant  -     GASTROENTEROLOGY ADULT REF CONSULT ONLY  - She has chronic ongoing abd pain and constipation that has been resistant to Miralax tx though we've struggled to escalate dose in the past. She requests GI referral, we've done this once in the past but she had to urgently leave the country to take care of ill family member so never completed.    There are no discontinued medications.    Patient Instructions   Abdominal Pain  1. Continue avoiding foods that make your pain " worse.  2. Continue seeing nephrology for kidney stones.  3. Provided referral to [facility]  1. Prescribed ibuprofen today. Take one tablet every three hours as needed for pain.  2. I recommend ice massage using the disposable cup trick I showed you in clinic.  3. Consider using orthotics in shoes for support. (see images below).  4. Will consider future podiatry referral if symptoms don't resolve or worsen.    Options for treatment and follow-up care were reviewed with the patient. Terrance Mcwilliams  engaged in the decision making process and verbalized understanding of the options discussed and agreed with the final plan.    The information in this document, created by the medical scribe for me, accurately reflects the services I personally performed and the decisions made by me. I have reviewed and approved this document for accuracy prior to leaving the patient care area.    Zoe Edouard MD  3:12 PM, 01/14/20  End Time: 3:40 PM  I spent 28 min face to face with the patient and >50% was spent counselling the patient about the above medical conditions, educating patient on their medical conditions, behavioral interventions and supports.

## 2020-01-14 NOTE — Clinical Note
ANTONIO only - saw this pt. She wanted my opinion, too. I asked Obcarlos albertotajeff, as she was there, if bursitis is a thing in the foot. She asked if I ultrasounded it - so I did - and it was bursitis! Just a thought for you in the future to consider throwing an US on it and see what you see!c

## 2020-01-14 NOTE — NURSING NOTE
Due to patient being non-English speaking/uses sign language, an  was used for this visit. Only for face-to-face interpretation by an external agency, date and length of interpretation can be found on the scanned worksheet.     name: Rachel Car  Language: Barbadian  Agency: [employer]  Phone number: [phone]  Type of interpretation: Face-to-face, spoken      Alanis Mcwilliams CMA

## 2020-01-29 ENCOUNTER — PRE VISIT (OUTPATIENT)
Dept: UROLOGY | Facility: CLINIC | Age: 40
End: 2020-01-29

## 2020-01-29 ENCOUNTER — TELEPHONE (OUTPATIENT)
Dept: UROLOGY | Facility: CLINIC | Age: 40
End: 2020-01-29

## 2020-01-29 DIAGNOSIS — N20.0 KIDNEY STONE: ICD-10-CM

## 2020-01-29 DIAGNOSIS — N20.0 CALCULUS OF KIDNEY: Primary | ICD-10-CM

## 2020-01-29 NOTE — TELEPHONE ENCOUNTER
Chief Complaint : Return-1 yr    Hx/Sx: Stones    Records/Orders: CT Requested     Pt Contacted: n/a    At Rooming: Normal

## 2020-01-29 NOTE — TELEPHONE ENCOUNTER
----- Message from Abebe Lea, EMT sent at 1/29/2020  4:08 PM CST -----  Regarding: CT Please  Hi!    Can we please get a stone CT before Ossob comes in 2/7/20!?     Thank you!

## 2020-01-29 NOTE — TELEPHONE ENCOUNTER
Left message for pt informing of CT scan scheduled at 8:40 prior to her appt and to call and reschedule if she would like to have it done a different day or time prior to her appt.

## 2020-02-04 ENCOUNTER — TRANSFERRED RECORDS (OUTPATIENT)
Dept: HEALTH INFORMATION MANAGEMENT | Facility: CLINIC | Age: 40
End: 2020-02-04

## 2020-02-07 ENCOUNTER — ALLIED HEALTH/NURSE VISIT (OUTPATIENT)
Dept: UROLOGY | Facility: CLINIC | Age: 40
End: 2020-02-07
Payer: COMMERCIAL

## 2020-02-07 ENCOUNTER — OFFICE VISIT (OUTPATIENT)
Dept: UROLOGY | Facility: CLINIC | Age: 40
End: 2020-02-07
Payer: COMMERCIAL

## 2020-02-07 VITALS — SYSTOLIC BLOOD PRESSURE: 138 MMHG | DIASTOLIC BLOOD PRESSURE: 87 MMHG | HEART RATE: 44 BPM

## 2020-02-07 DIAGNOSIS — N20.0 CALCULUS OF KIDNEY: Primary | ICD-10-CM

## 2020-02-07 RX ORDER — CEFAZOLIN SODIUM 1 G/50ML
1 INJECTION, SOLUTION INTRAVENOUS SEE ADMIN INSTRUCTIONS
Status: CANCELLED | OUTPATIENT
Start: 2020-02-07

## 2020-02-07 RX ORDER — CEFAZOLIN SODIUM 2 G/50ML
2 SOLUTION INTRAVENOUS
Status: CANCELLED | OUTPATIENT
Start: 2020-02-07

## 2020-02-07 ASSESSMENT — PAIN SCALES - GENERAL: PAINLEVEL: NO PAIN (0)

## 2020-02-07 NOTE — PROGRESS NOTES
Pre Op Teaching Flowsheet       Pre and Post op Patient Education  Relevant Diagnosis:  Kidney stone      Motivation Level:  Asks Questions: Yes  Eager to Learn:  Yes  Cooperative: Yes  Receptive (willing/able to accept information):  Yes  Patient demonstrates understanding of the following:  Date and time of surgery:  April 15th  Location of surgery: [address] [place] [address]  History and Physical and any other testing necessary prior to surgery: Yes, PAC on April 1st  Required time line for completion of History and Physical and any pre-op testing: Yes    NPO Guidelines: Nothing to eat 8 hours prior to surgery. Can have clear liquids up to 2 hours prior to sugery    Patient demonstrates understanding of the following:  Pre-op bowel prep: N/A  Pre-op showering/scrub information with Hibiclens Soap: Yes  Medications to take the day of surgery:  Per PCP  Blood thinner medications discussed and when to stop (if applicable):  Yes  Diabetes medication management (if applicable):  N/A  Discussed pain control after surgery: pain scale, pain medications and pain management techniques  Infection Prevention: Patient demonstrates understanding of the following:  Patient instructed on hand hygiene:  Yes  Surgical procedure site care taught: Yes  Signs and symptoms of infection taught:  Yes  Wound care will be taught at the time of discharge.  Central venous catheter care will be taught at the time of discharge (if applicable).    Post-op follow-up:  Discussed how to contact the hospital, nurse, and clinic scheduling staff if necessary.    Instructional materials used/given/mailed:  [place] Surgery Booklet, post op teaching sheet, Map, Soap, and arrival/location information.    Surgical instructions given to patient in clinic: Yes.    Instructional Materials given:  Before your surgery packet , Medications to avoid before surgery , Showering or Bathing instructions before surgery  and What to expect after surgery  Total time with  patient: 10 minutes    Erin Lea RN

## 2020-02-07 NOTE — LETTER
2/7/2020       RE: Terrance Mcwilliams  [address]  [place] [address]     Dear Colleague,    Thank you for referring your patient, Terrance Mcwilliams, to the [facility] AND INST FOR PROSTATE AND UROLOGIC CANCERS at [facility]. Please see a copy of my visit note below.    Chief Complaint   Patient presents with     RECHECK     Stone consult        Adelaide Hough MA     UROLOGIC DIAGNOSES:        CURRENT INTERVENTIONS:        History:        Patient presents for evaluation and management of renal calculi. Had evaluation for LUQ pain and was found on imaging to have large left renal/UPJ calculus.   She notes that this stone has been present for years and that she has not had symptoms from it.  [place] has been [place] one [place].   Presents again with large left renal calculus increased in size from last visit.     We discussed PCNL vs staged ureteroscopy for stone management. Discussed timing of same.         Imaging:      Labs:      MEDICATIONS:    Current Outpatient Medications:      Acetaminophen (TYLENOL PO), , Disp: , Rfl:      levonorgestrel-ethinyl estradiol (AVIANE,ALESSE,LESSINA) 0.1-20 MG-MCG per tablet, Take 1 tablet by mouth daily, Disp: 84 tablet, Rfl: 3     omeprazole (PRILOSEC) 20 MG CR capsule, , Disp: , Rfl: 0     polyethylene glycol (MIRALAX/GLYCOLAX) powder, , Disp: , Rfl: 0     tamsulosin (FLOMAX) 0.4 MG capsule, , Disp: , Rfl: 0    ALLERGIES:   No Known Allergies    REVIEW OF SYSTEMS: Ten point review of systems without change as outlined in HPI    SURGICAL HISTORY:    Past Surgical History:   Procedure Laterality Date     C/SECTION, LOW TRANSVERSE  10/09    failure to progress         PHYSICAL EXAM:    /87 (BP Location: Right arm, Patient Position: Sitting, Cuff Size: Adult Regular)   Pulse (!) 44   LMP  (LMP Unknown)     HEENT: Normocephalic and atraumatic    Cardiac: Not done    Back/Flank: Not done    CNS/PNS: Alert and  oriented    Respiratory: Normal nonlabored breathing    Abdomen: Soft nontender and nondistended    Peripheral Vascular: No peripheral edema    Mental Status: Normal    External Genitalia: Not done    Bladder: Not done    Urethra: Not done     Vagina: Not done    Cystoscopy:  Not Done      Urinalysis:  UA RESULTS:  Recent Labs   Lab Test 07/25/18 1934   COLOR Yellow   APPEARANCE Clear   URINEGLC Negative   URINEBILI Negative   URINEKETONE Negative   SG 1.008   UBLD Moderate*   URINEPH 5.5   PROTEIN Negative   NITRITE Negative   LEUKEST Small*   RBCU 23*   WBCU 12*         IMPRESSION:    41 y/o F with large left renal pelvis calculus     PLAN:    Schedule for PCNL next available       Total Time:  15 minutes  Time in Consultation: greater than 50%       Again, thank you for allowing me to participate in the care of your patient.      Sincerely,    Luisana Ortiz MD

## 2020-02-10 ENCOUNTER — PREP FOR PROCEDURE (OUTPATIENT)
Dept: UROLOGY | Facility: CLINIC | Age: 40
End: 2020-02-10

## 2020-02-10 ENCOUNTER — HOSPITAL ENCOUNTER (OUTPATIENT)
Facility: CLINIC | Age: 40
End: 2020-02-10
Attending: UROLOGY | Admitting: UROLOGY

## 2020-02-10 NOTE — TELEPHONE ENCOUNTER
FUTURE VISIT INFORMATION      SURGERY INFORMATION:    Date: 4/15/20    Location: [facility] OR    Surgeon:  Luisana Ortiz MD    Anesthesia Type:  General    Procedure: NEPHROLITHOTOMY, PERCUTANEOUS with interventional radiology    Consult: OV 2/7/20- [facility]    RECORDS REQUESTED FROM:       Primary Care Provider: Zoe Edouard MD - [facility]    Most recent EKG+ Tracing: 3/31/17    Most recent Cardiac Stress Test: 3/31/17

## 2020-02-12 DIAGNOSIS — N20.0 CALCULUS OF KIDNEY: Primary | ICD-10-CM

## 2020-02-14 NOTE — PROGRESS NOTES
UROLOGIC DIAGNOSES:        CURRENT INTERVENTIONS:        History:        Patient presents for evaluation and management of renal calculi. Had evaluation for LUQ pain and was found on imaging to have large left renal/UPJ calculus.   She notes that this stone has been present for years and that she has not had symptoms from it.  [place] has been abroad for over one [place].   Presents again with large left renal calculus increased in size from last visit.     We discussed PCNL vs staged ureteroscopy for stone management. Discussed timing of same.         Imaging:      Labs:      MEDICATIONS:    Current Outpatient Medications:      Acetaminophen (TYLENOL PO), , Disp: , Rfl:      levonorgestrel-ethinyl estradiol (AVIANE,ALESSE,LESSINA) 0.1-20 MG-MCG per tablet, Take 1 tablet by mouth daily, Disp: 84 tablet, Rfl: 3     omeprazole (PRILOSEC) 20 MG CR capsule, , Disp: , Rfl: 0     polyethylene glycol (MIRALAX/GLYCOLAX) powder, , Disp: , Rfl: 0     tamsulosin (FLOMAX) 0.4 MG capsule, , Disp: , Rfl: 0    ALLERGIES:   No Known Allergies    REVIEW OF SYSTEMS: Ten point review of systems without change as outlined in HPI    SURGICAL HISTORY:    Past Surgical History:   Procedure Laterality Date     C/SECTION, LOW TRANSVERSE  10/09    failure to progress         PHYSICAL EXAM:    /87 (BP Location: Right arm, Patient Position: Sitting, Cuff Size: Adult Regular)   Pulse (!) 44   LMP  (LMP Unknown)     HEENT: Normocephalic and atraumatic    Cardiac: Not done    Back/Flank: Not done    CNS/PNS: Alert and oriented    Respiratory: Normal nonlabored breathing    Abdomen: Soft nontender and nondistended    Peripheral Vascular: No peripheral edema    Mental Status: Normal    External Genitalia: Not done    Bladder: Not done    Urethra: Not done     Vagina: Not done    Cystoscopy:  Not Done      Urinalysis:  UA RESULTS:  Recent Labs   Lab Test 07/25/18  1934   COLOR Yellow   APPEARANCE Clear   URINEGLC Negative   URINEBILI Negative    URINEKETONE Negative   SG 1.008   UBLD Moderate*   URINEPH 5.5   PROTEIN Negative   NITRITE Negative   LEUKEST Small*   RBCU 23*   WBCU 12*         IMPRESSION:    39 y/o F with large left renal pelvis calculus     PLAN:    Schedule for PCNL next available       Total Time:  15 minutes  Time in Consultation: greater than 50%

## 2020-04-01 ENCOUNTER — PRE VISIT (OUTPATIENT)
Dept: SURGERY | Facility: CLINIC | Age: 40
End: 2020-04-01

## 2020-07-23 ENCOUNTER — PREP FOR PROCEDURE (OUTPATIENT)
Dept: UROLOGY | Facility: CLINIC | Age: 40
End: 2020-07-23

## 2020-08-11 DIAGNOSIS — N20.0 KIDNEY STONE: Primary | ICD-10-CM

## 2020-08-19 ENCOUNTER — TELEPHONE (OUTPATIENT)
Dept: UROLOGY | Facility: CLINIC | Age: 40
End: 2020-08-19

## 2020-08-19 NOTE — TELEPHONE ENCOUNTER
Attempted to call pt via  to schedule a virtual visit consult with Dr. Matias to discuss surgery with a CT scan done at least 3 days prior. No answer, unable to leave message.

## 2020-08-19 NOTE — TELEPHONE ENCOUNTER
----- Message from Soledad Mishra CMA sent at 8/17/2020  9:35 AM CDT -----  Good morning!  Please see below... can we please get him scheduled?  Can be virtual.  Thank you!!    Bianca  ----- Message -----  From: Tirso Matias MD  Sent: 8/11/2020  10:22 AM CDT  To: Soledad Lujan CMA    Hi - this patient really should get repeat imaging (CT scan) and discuss surgery with me prior to us scheduling anything.  I am not sure I recommend a PCNL and its been 9 months since we last checked on his stone.    Bianca can you please set him up for a visit?  I will place imaging order  Thanks  Jerod  ----- Message -----  From: Krystal Pinzon  Sent: 8/7/2020   8:05 AM CDT  To: Tirso Matias MD    Okay would you be able to put in new orders? It might just be easier to have a new order then to keep modifying this one.    ThanksErvin  ----- Message -----  From: Tirso Matias MD  Sent: 8/6/2020  11:22 PM CDT  To: Krystal Pinzon    I do not thanks  ----- Message -----  From: Krystal Pinzon  Sent: 8/6/2020   3:11 PM CDT  To: Tirso Matias MD    Hi Dr. Matias,    The orders for Ossob are NEPHROLITHOTOMY, PERCUTANEOUS with interventional radiology. These were the original orders from Dr. Ortiz. Will you need IR as well?    Ervin Cardozo

## 2020-10-01 ENCOUNTER — PRE VISIT (OUTPATIENT)
Dept: UROLOGY | Facility: CLINIC | Age: 40
End: 2020-10-01

## 2022-12-05 ENCOUNTER — HOSPITAL ENCOUNTER (OUTPATIENT)
Facility: CLINIC | Age: 42
End: 2022-12-05
Payer: COMMERCIAL

## 2024-10-28 ENCOUNTER — OFFICE VISIT (OUTPATIENT)
Dept: FAMILY MEDICINE | Facility: CLINIC | Age: 44
End: 2024-10-28
Payer: COMMERCIAL

## 2024-10-28 VITALS
HEART RATE: 81 BPM | HEIGHT: 67 IN | OXYGEN SATURATION: 100 % | RESPIRATION RATE: 19 BRPM | WEIGHT: 207.7 LBS | SYSTOLIC BLOOD PRESSURE: 139 MMHG | DIASTOLIC BLOOD PRESSURE: 87 MMHG | TEMPERATURE: 98.5 F | BODY MASS INDEX: 32.6 KG/M2

## 2024-10-28 DIAGNOSIS — R10.2 VAGINAL PAIN: Primary | ICD-10-CM

## 2024-10-28 DIAGNOSIS — N64.59 BREAST SYMPTOM: ICD-10-CM

## 2024-10-28 DIAGNOSIS — N95.2 VAGINAL ATROPHY: ICD-10-CM

## 2024-10-28 DIAGNOSIS — Z76.89 ENCOUNTER TO ESTABLISH CARE: ICD-10-CM

## 2024-10-28 LAB
CLUE CELLS: NORMAL
TRICHOMONAS, WET PREP: NORMAL
WBC'S/HIGH POWER FIELD, WET PREP: NORMAL
YEAST, WET PREP: NORMAL

## 2024-10-28 PROCEDURE — 87491 CHLMYD TRACH DNA AMP PROBE: CPT

## 2024-10-28 PROCEDURE — 88175 CYTOPATH C/V AUTO FLUID REDO: CPT

## 2024-10-28 PROCEDURE — 87591 N.GONORRHOEAE DNA AMP PROB: CPT

## 2024-10-28 PROCEDURE — 99204 OFFICE O/P NEW MOD 45 MIN: CPT | Mod: GC

## 2024-10-28 PROCEDURE — 87210 SMEAR WET MOUNT SALINE/INK: CPT

## 2024-10-28 PROCEDURE — 87624 HPV HI-RISK TYP POOLED RSLT: CPT

## 2024-10-28 RX ORDER — ESTRADIOL 0.1 MG/G
CREAM VAGINAL
Qty: 0.5 G | Refills: 3 | Status: SHIPPED | OUTPATIENT
Start: 2024-10-28

## 2024-10-28 ASSESSMENT — PAIN SCALES - GENERAL: PAINLEVEL_OUTOF10: MODERATE PAIN (5)
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
## 2024-10-28 NOTE — LETTER
November 5, 2024      Terrance Mcwilliams  [address]   [facility] [address]        Dear ,    We are writing to inform you of your test results.    Your test results fall within the expected range(s) or remain unchanged from previous results.  Please continue with current treatment plan.    Resulted Orders   Chlamydia trachomatis/Neisseria gonorrhoeae by PCR - Clinic Collect   Result Value Ref Range    Chlamydia Trachomatis Negative Negative      Comment:      Negative for C. trachomatis rRNA by transcription mediated amplification.   A negative result by transcription mediated amplification does not preclude the presence of infection because results are dependent on proper and adequate collection, absence of inhibitors and sufficient rRNA to be detected.    Neisseria gonorrhoeae Negative Negative      Comment:      Negative for N. gonorrhoeae rRNA by transcription mediated amplification. A negative result by transcription mediated amplification does not preclude the presence of C. trachomatis infection because results are dependent on proper and adequate collection, absence of inhibitors and sufficient rRNA to be detected.   Wet preparation - Clinic Collect   Result Value Ref Range    Trichomonas Absent Absent    Yeast Absent Absent    Clue Cells Absent Absent    WBCs/high power field None None    Narrative    pH Level: >4.5; Whiff Test: Negative.   HPV and Gynecologic Cytology Panel - recommended age 30 - 65 years   Result Value Ref Range    Human Papilloma Virus 16 DNA Negative Negative    Human Papilloma Virus 18 DNA Negative Negative    Human Papilloma Virus Other Negative Negative    FINAL DIAGNOSIS       This patient's sample is negative for high risk HPV DNA.          METHODOLOGY: The [employer] system uses automated extraction, simultaneous amplification of HPV (E6/E7 oncogenes) and beta-globin, followed by real time detection of fluorescent labeled HPV and beta globin using specific  oligonucleotide probes. The test specifically identifies types HPV 16 DNA and HPV 18 DNA while concurrently detecting the rest of the high risk types (31, 33, 35, 39, 45, 51, 52, 56, 58, 59, 66 or 68).     COMMENTS: This test is not intended for use as a screening device for woman under age 30 with normal cervical cytology. Results should be correlated with cytologic and histologic findings. Close clinical follow up is recommended.      Please see the separate Gynecologic Cytology (Pap) report from the same collection date.     Gynecologic Cytology (PAP)   Result Value Ref Range    Interpretation        Negative for Intraepithelial Lesion or Malignancy (NILM)    Comment         Papanicolaou Test Limitations:  Cervical cytology is a screening test with limited sensitivity, and regular screening is critical for cancer prevention.  Pap tests are primarily effective for the diagnosis/prevention of squamous cell carcinoma, not adenocarcinoma or other cancers.        Specimen Adequacy       Satisfactory for evaluation, endocervical/transformation zone component present    Clinical Information       none      Previous Abnormal?       No      Performing Labs       The technical component of this testing was completed at [facility].    Stain controls for all stains resulted within this report have been reviewed and show appropriate reactivity.      Associated HPV Report       Please see the associated HPV High Risk Types DNA Cervical report for Specimen [id number] from the same collection date.         If you have any questions or concerns, please call the clinic at the number listed above.       Sincerely,      Ramona Flores MD

## 2024-10-28 NOTE — PATIENT INSTRUCTIONS
Patient Education   Here is the plan from today's visit    1. Vaginal pain (Primary)  - Chlamydia trachomatis/Neisseria gonorrhoeae by PCR - Clinic Collect  - Wet preparation - Clinic Collect  - HPV and Gynecologic Cytology Panel - recommended age 30 - 65 years    2. Vaginal atrophy  Insert medication inside the vagina for every night before you go to bed. Do this for two weeks. After the first two weeks, use this cream 2 times a week every week. Follow up with me in one month.  - estradiol (ESTRACE) 0.1 MG/GM vaginal cream; 0.5 g of cream intravaginally administered daily for 2 weeks, then reduce to twice weekly  Dispense: 0.5 g; Refill: 3          Please call or return to clinic if your symptoms don't go away.    Follow up plan  Return in about 4 weeks (around 11/25/2024).    Thank you for coming to [facility] today.  Lab Testing:  **If you had lab testing today and your results are reassuring or normal they will be mailed to you or sent through [facility] within 7 days.   **If the lab tests need quick action we will call you with the results.  **If you are having labs done on a different day, please call [phone] to schedule at [facility] or [phone] for other [facility] locations. Labs do not offer walk-in appointments.  The phone number we will call with results is # [phone] (home) . If this is not the best number please call our clinic and change the number.  Medication Refills:  If you need any refills please call your pharmacy and they will contact us.   If you need to  your refill at a new pharmacy, please contact the new pharmacy directly. The new pharmacy will help you get your medications transferred faster.   Scheduling:  If you have any concerns about today's visit or wish to schedule another appointment please call our office during normal business hours [phone] (8-5:00 M-F). If you can no longer make a scheduled visit, please cancel via [facility] or  call us to cancel.   If a referral was made to an [facility] specialty provider and you do not get a call from central scheduling, please refer to directions on your visit summary or call our office during normal business hours for assistance.   If a Mammogram was ordered for you at the [facility] call [phone] to schedule or change your appointment.  If you had an XRay/CT/Ultrasound/MRI ordered the number is [phone] to schedule or change your radiology appointment.   [facility] has limited ultrasound appointments available on Wednesdays, if you would like your ultrasound at [facility], please call [phone] to schedule.   Medical Concerns:  If you have urgent medical concerns please call [phone] at any time of the day.    Ramona Flores MD

## 2024-10-28 NOTE — PROGRESS NOTES
Preceptor Attestation:   Patient seen, evaluated and discussed with the resident. I have verified the content of the note, which accurately reflects my assessment of the patient and the plan of care.   Supervising Physician:  Nitin Palacios MD

## 2024-10-29 LAB
C TRACH DNA SPEC QL PROBE+SIG AMP: NEGATIVE
N GONORRHOEA DNA SPEC QL NAA+PROBE: NEGATIVE

## 2024-10-31 LAB
HPV HR 12 DNA CVX QL NAA+PROBE: NEGATIVE
HPV16 DNA CVX QL NAA+PROBE: NEGATIVE
HPV18 DNA CVX QL NAA+PROBE: NEGATIVE
HUMAN PAPILLOMA VIRUS FINAL DIAGNOSIS: NORMAL

## 2024-11-02 LAB
BKR AP ASSOCIATED HPV REPORT: NORMAL
BKR LAB AP GYN ADEQUACY: NORMAL
BKR LAB AP GYN INTERPRETATION: NORMAL
BKR LAB AP PREVIOUS ABNORMAL: NORMAL
PATH REPORT.COMMENTS IMP SPEC: NORMAL
PATH REPORT.COMMENTS IMP SPEC: NORMAL
PATH REPORT.RELEVANT HX SPEC: NORMAL

## 2024-11-27 ENCOUNTER — OFFICE VISIT (OUTPATIENT)
Dept: FAMILY MEDICINE | Facility: CLINIC | Age: 44
End: 2024-11-27
Payer: COMMERCIAL

## 2024-11-27 VITALS
TEMPERATURE: 98 F | BODY MASS INDEX: 32.65 KG/M2 | RESPIRATION RATE: 16 BRPM | DIASTOLIC BLOOD PRESSURE: 85 MMHG | SYSTOLIC BLOOD PRESSURE: 129 MMHG | HEART RATE: 80 BPM | OXYGEN SATURATION: 98 % | HEIGHT: 67 IN | WEIGHT: 208 LBS

## 2024-11-27 DIAGNOSIS — R10.9 CHRONIC ABDOMINAL PAIN: Primary | ICD-10-CM

## 2024-11-27 DIAGNOSIS — K62.89 RECTAL PAIN: ICD-10-CM

## 2024-11-27 DIAGNOSIS — G89.29 CHRONIC ABDOMINAL PAIN: Primary | ICD-10-CM

## 2024-11-27 NOTE — PATIENT INSTRUCTIONS
Patient Education   Here is the plan from today's visit    1. Chronic abdominal pain (Primary)  - Colonoscopy Screening  Referral; Future    2. Rectal pain  - diltiazem 2% 2% OINT ointment; Put a dallop on your finger and apply to the area of pain twice a day as needed.  Dispense: 30 g; Refill: 0    Please call or return to clinic if your symptoms don't go away.    Follow up plan  No follow-ups on file.    Thank you for coming to [facility] today.  Lab Testing:  **If you had lab testing today and your results are reassuring or normal they will be mailed to you or sent through [facility] within 7 days.   **If the lab tests need quick action we will call you with the results.  **If you are having labs done on a different day, please call [phone] to schedule at [facility] or [phone] for other [facility] locations. Labs do not offer walk-in appointments.  The phone number we will call with results is # [phone] (home) . If this is not the best number please call our clinic and change the number.  Medication Refills:  If you need any refills please call your pharmacy and they will contact us.   If you need to  your refill at a new pharmacy, please contact the new pharmacy directly. The new pharmacy will help you get your medications transferred faster.   Scheduling:  If you have any concerns about today's visit or wish to schedule another appointment please call our office during normal business hours [phone] (8-5:00 M-F). If you can no longer make a scheduled visit, please cancel via [facility] [facility] or call us to cancel.   If a referral was made to an [facility] specialty provider and you do not get a call from central scheduling, please refer to directions on your visit summary or call our office during normal business hours for assistance.   If a Mammogram was ordered for you at the Breast Center call [phone] to schedule or change your appointment.  If you  had an XRay/CT/Ultrasound/MRI ordered the number is [phone] to schedule or change your radiology appointment.   [facility] has limited ultrasound appointments available on Wednesdays, if you would like your ultrasound at [facility], please call [phone] to schedule.   Medical Concerns:  If you have urgent medical concerns please call [phone] at any time of the day.    Disha Bhakta, DO

## 2024-11-27 NOTE — PROGRESS NOTES
"  Assessment & Plan     Chronic abdominal pain  Rectal pain  Ongoing for the past year. Adamant about colonoscopy due to concern for underlying pathology that may be contributing to her current symptoms. Rectal pain during TAYA when palpating the 3 o'clock position. Could not see or feel internal or external hemorrhoids. No fissures. Pelvic exam on 10/28/2024 was negative for pathology. Infectious work up negative. Possibly has proctalgia fugax. Recommended pelvic floor PT, but she was amenable only after colonoscopy. Diltiazem ointment ordered for rectal pain to see if there is improvement in symptoms.   - Colonoscopy Screening  Referral; Future  - diltiazem 2% 2% OINT ointment; Put a dallop on your finger and apply to the area of pain twice a day as needed.    BMI  Estimated body mass index is 32.58 kg/m  as calculated from the following:    Height as of this encounter: 1.702 m (5' 7\").    Weight as of this encounter: 94.3 kg (208 lb).     Return in about 4 weeks (around 12/25/2024).    Shayan Carlos is a 44 year old, presenting for the following health issues:  Pain (Glute pain for 1 year) and Referral (Colon )      11/27/2024     9:34 AM   Additional Questions   Roomed by kim   Accompanied by self         11/27/2024    Information    services provided? Yes   Language Lebanese   Type of interpretation provided Face-to-face    name Krupa    Agency [employer]        [facility]   - Presents to the clinic for rectal pain  - Began 1 year ago  - Pain exacerbated by sitting  - Pain is intermittent with bowel movement  - No blood in stool or on toilet paper when wiping  - This has never happened before  - Occasionally feels pinching or like something is moving around  - Pain described as sharp and itching  - Experiences numbness in lower extremities when she feels the pain    Review of Systems  Constitutional, neuro, ENT, endocrine, pulmonary, cardiac, gastrointestinal, " "genitourinary, musculoskeletal, integument and psychiatric systems are negative, except as otherwise noted.      Objective    /85   Pulse 80   Temp 98  F (36.7  C) (Oral)   Resp 16   Ht 1.702 m (5' 7\")   Wt 94.3 kg (208 lb)   SpO2 98%   BMI 32.58 kg/m    Body mass index is 32.58 kg/m .  Physical Exam  Vitals reviewed.   Constitutional:       Appearance: Normal appearance.   HENT:      Head: Normocephalic and atraumatic.   Pulmonary:      Effort: Pulmonary effort is normal.   Abdominal:      General: There is no distension.      Palpations: Abdomen is soft.      Tenderness: There is no abdominal tenderness. There is no right CVA tenderness or left CVA tenderness.   Genitourinary:     General: Normal vulva.      Vagina: No vaginal discharge or tenderness.      Cervix: Normal. No cervical motion tenderness.      Rectum: Tenderness (3 o'clock position) present. No anal fissure, external hemorrhoid or internal hemorrhoid.   Musculoskeletal:         General: Normal range of motion.      Cervical back: Normal range of motion and neck supple.   Skin:     General: Skin is warm and dry.   Neurological:      General: No focal deficit present.      Mental Status: She is alert and oriented to person, place, and time.   Psychiatric:         Mood and Affect: Mood normal.         Behavior: Behavior normal.         Thought Content: Thought content normal.         Judgment: Judgment normal.           Signed Electronically by: Disha Bhakta DO, MPH  "

## 2024-11-27 NOTE — PROGRESS NOTES
Preceptor Attestation:    I discussed the patient with the resident and evaluated the patient in person. I have verified the content of the note, which accurately reflects my assessment of the patient and the plan of care.   Supervising Physician:  Emilie Fernandez MD.

## 2024-12-10 ENCOUNTER — TELEPHONE (OUTPATIENT)
Dept: GASTROENTEROLOGY | Facility: CLINIC | Age: 44
End: 2024-12-10
Payer: COMMERCIAL

## 2024-12-10 NOTE — TELEPHONE ENCOUNTER
"Endoscopy Scheduling Screen    Have you had any respiratory illness or flu-like symptoms in the last 10 days?  No    What is your communication preference for Instructions and/or Bowel Prep?   Mail/USPS    What insurance is in the chart?  Other:  [facility]    Ordering/Referring Provider: REBECCA DELANEY   (If ordering provider performs procedure, schedule with ordering provider unless otherwise instructed. )    BMI: Estimated body mass index is 32.58 kg/m  as calculated from the following:    Height as of 11/27/24: 1.702 m (5' 7\").    Weight as of 11/27/24: 94.3 kg (208 lb).     Sedation Ordered  moderate sedation.   If patient BMI > 50 do not schedule in [facility].    If patient BMI > 45 do not schedule at [facility].    Are you taking methadone or Suboxone?  NO, No RN review required.    Have you been diagnosed and are being treated for severe PTSD or severe anxiety?  NO, No RN review required.    Are you taking any prescription medications for pain 3 or more times per week?   NO, No RN review required.    Do you have a history of malignant hyperthermia?  No    (Females) Are you currently pregnant?   No     Have you been diagnosed or told you have pulmonary hypertension?   No    Do you have an LVAD?  No    Have you been told you have moderate to severe sleep apnea?  No.    Have you been told you have COPD, asthma, or any other lung disease?  No    Do you have any heart conditions?  No     Have you ever had or are you waiting for an organ transplant?  No. Continue scheduling, no site restrictions.    Have you had a stroke or transient ischemic attack (TIA aka \"mini stroke\" in the last 6 months?   No    Have you been diagnosed with or been told you have cirrhosis of the liver?   No.    Are you currently on dialysis?   No    Do you need assistance transferring?   No    BMI: Estimated body mass index is 32.58 kg/m  as calculated from the following:    Height as of 11/27/24: 1.702 m (5' 7\").    Weight as of 11/27/24: 94.3 kg (208 " lb).     Is patients BMI > 40 and scheduling location [facility]?  No    Do you take an injectable or oral medication for weight loss or diabetes (excluding insulin)?  No    Do you take the medication Naltrexone?  No    Do you take blood thinners?  No       Prep   Are you currently on dialysis or do you have chronic kidney disease?  No    Do you have a diagnosis of diabetes?  No    Do you have a diagnosis of cystic fibrosis (CF)?  No    On a regular basis do you go 3 -5 days between bowel movements?  No    BMI > 40?  No    Preferred Pharmacy:    [place], [place] - [address] [facility] [address]  Phone: [phone] Fax: [phone]    Final Scheduling Details     Procedure scheduled  Colonoscopy    Surgeon:       Date of procedure:  2/13/25     Pre-OP / PAC:   No - Not required for this site.    Location  [facility] - [facility] - Per order.    Sedation   Moderate Sedation - Per order.      Patient Reminders:   You will receive a call from a Nurse to review instructions and health history.  This assessment must be completed prior to your procedure.  Failure to complete the Nurse assessment may result in the procedure being cancelled.      On the day of your procedure, please designate an adult(s) who can drive you home stay with you for the next 24 hours. The medicines used in the exam will make you sleepy. You will not be able to drive.      You cannot take public transportation, ride share services, or non-medical taxi service without a responsible caregiver.  Medical transport services are allowed with the requirement that a responsible caregiver will receive you at your destination.  We require that drivers and caregivers are confirmed prior to your procedure.

## 2025-01-15 ENCOUNTER — TELEPHONE (OUTPATIENT)
Dept: GASTROENTEROLOGY | Facility: CLINIC | Age: 45
End: 2025-01-15
Payer: COMMERCIAL

## 2025-01-15 NOTE — TELEPHONE ENCOUNTER
Please note tat pt's VM was not set up  Caller: writer To pt    Reason for Reschedule/Cancellation (please be detailed, any staff messages or encounters to note?):       Did you cancel or rescheduled an [facility] procedure? No.    Is screening questionnaire older than 3 months from the reschedule date.   If Yes, please complete screening questionnaire. No    Prior to reschedule please review:  Ordering Provider: Emilie Fernandez MD  Sedation Determined: CS  Does patient have any ASC Exclusions, please identify?: N    Notes on Cancelled Procedure:  Procedure: Lower Endoscopy [Colonoscopy]   Date: 02/13/2025  [facility] [address] [place] [address], [address] Floor, [place] [address]  Surgeon:     Rescheduled: No, Case in depot

## 2025-07-07 ENCOUNTER — OFFICE VISIT (OUTPATIENT)
Dept: FAMILY MEDICINE | Facility: CLINIC | Age: 45
End: 2025-07-07
Payer: COMMERCIAL

## 2025-07-07 VITALS
TEMPERATURE: 98 F | BODY MASS INDEX: 30.67 KG/M2 | HEART RATE: 66 BPM | OXYGEN SATURATION: 100 % | RESPIRATION RATE: 14 BRPM | DIASTOLIC BLOOD PRESSURE: 87 MMHG | SYSTOLIC BLOOD PRESSURE: 133 MMHG | WEIGHT: 195.4 LBS | HEIGHT: 67 IN

## 2025-07-07 DIAGNOSIS — M79.672 PAIN IN BOTH FEET: Primary | ICD-10-CM

## 2025-07-07 DIAGNOSIS — Z12.31 VISIT FOR SCREENING MAMMOGRAM: ICD-10-CM

## 2025-07-07 DIAGNOSIS — J30.89 DUST ALLERGY: ICD-10-CM

## 2025-07-07 DIAGNOSIS — M79.671 PAIN IN BOTH FEET: Primary | ICD-10-CM

## 2025-07-07 DIAGNOSIS — H93.8X3 BLOCKED EAR, BILATERAL: ICD-10-CM

## 2025-07-07 DIAGNOSIS — H61.22 IMPACTED CERUMEN OF LEFT EAR: ICD-10-CM

## 2025-07-07 PROCEDURE — 99213 OFFICE O/P EST LOW 20 MIN: CPT | Mod: GC

## 2025-07-07 PROCEDURE — 3079F DIAST BP 80-89 MM HG: CPT

## 2025-07-07 PROCEDURE — 3075F SYST BP GE 130 - 139MM HG: CPT

## 2025-07-07 RX ORDER — CETIRIZINE HYDROCHLORIDE 10 MG/1
10 TABLET ORAL AT BEDTIME
Qty: 30 TABLET | Refills: 3 | Status: SHIPPED | OUTPATIENT
Start: 2025-07-07

## 2025-07-14 NOTE — PROGRESS NOTES
"  Assessment & Plan     Pain of foot, bilateral  Pain at left metatarsal head, heel of foot. Wears shoe-inserts at work but is on feet for most of the day. Reports pain even at home, though it seems to be less while wearing crocs. Asked patient if she could wear crocs to work, but she says she's not allowed. Currently wears flexible material but narrow-fitting sketchers; recommended wider shoes. Dropped medial arches on exam, likely underlying plantar fascitis. Reviewed symptomatic care and strengthening exercises: roll out w/ tennis ball and frozen water bottle, towel toe-grabs, epsom salt soaks.     Blocked ear, bilateral  Dust allergy  - cetirizine (ZYRTEC) 10 MG tablet; Take 1 tablet (10 mg) by mouth at bedtime.    Impacted cerumen of left ear  - carbamide peroxide (DEBROX) 6.5 % otic solution; Place 5 drops into both ears 2 times daily.    Visit for screening mammogram  - MA Screening Bilateral w/ Felix; Future    BMI  Estimated body mass index is 30.6 kg/m  as calculated from the following:    Height as of this encounter: 1.702 m (5' 7\").    Weight as of this encounter: 88.6 kg (195 lb 6.4 oz).       Subjective   Ossob is a 45 year old, presenting for the following health issues:  Pain (Right foot and bilateral ear pain)        7/7/2025    10:23 AM   Additional Questions   Roomed by Fabian LY      - foot pain, chronic but worse lately   - crocs at home, sketchers at work   - wearing heel inserts   - ears feel blocked and +/- painful     Objective    /87   Pulse 66   Temp 98  F (36.7  C) (Temporal)   Resp 14   Ht 1.702 m (5' 7\")   Wt 88.6 kg (195 lb 6.4 oz)   SpO2 100%   BMI 30.60 kg/m    Body mass index is 30.6 kg/m .  Physical Exam  Vitals reviewed.   Constitutional:       Appearance: Normal appearance.   HENT:      Head: Normocephalic and atraumatic.      Right Ear: Hearing, tympanic membrane, ear canal and external ear normal.      Left Ear: There is impacted cerumen.   Cardiovascular:      " Rate and Rhythm: Normal rate.   Pulmonary:      Effort: Pulmonary effort is normal.   Musculoskeletal:         General: Normal range of motion.      Cervical back: Normal range of motion.   Feet:      Comments: Dropped medial arches on exam; pain w/ pressured palpation of 5th metatarsal heads b/l feet; heel pain elicited with pressure; ROM intact; sensation intact b/l   Neurological:      General: No focal deficit present.      Mental Status: She is alert and oriented to person, place, and time. Mental status is at baseline.   Psychiatric:         Mood and Affect: Mood normal.         Behavior: Behavior normal.         Thought Content: Thought content normal.         Judgment: Judgment normal.               Signed Electronically by: Ursula Moon DO

## 2025-07-16 NOTE — PROGRESS NOTES
Preceptor Attestation:   Patient seen, evaluated and discussed with the resident. I have verified the content of the note, which accurately reflects my assessment of the patient and the plan of care.   Supervising Physician:  Jignesh Simpson MD

## 2025-08-13 ENCOUNTER — OFFICE VISIT (OUTPATIENT)
Dept: FAMILY MEDICINE | Facility: CLINIC | Age: 45
End: 2025-08-13
Payer: COMMERCIAL

## 2025-08-13 VITALS
TEMPERATURE: 98.8 F | SYSTOLIC BLOOD PRESSURE: 119 MMHG | OXYGEN SATURATION: 97 % | RESPIRATION RATE: 17 BRPM | BODY MASS INDEX: 30.7 KG/M2 | WEIGHT: 195.6 LBS | HEART RATE: 76 BPM | HEIGHT: 67 IN | DIASTOLIC BLOOD PRESSURE: 87 MMHG

## 2025-08-13 DIAGNOSIS — J30.89 ENVIRONMENTAL AND SEASONAL ALLERGIES: ICD-10-CM

## 2025-08-13 DIAGNOSIS — Z12.31 ENCOUNTER FOR SCREENING MAMMOGRAM FOR BREAST CANCER: ICD-10-CM

## 2025-08-13 DIAGNOSIS — Z13.6 SCREENING FOR CARDIOVASCULAR CONDITION: ICD-10-CM

## 2025-08-13 DIAGNOSIS — Z23 IMMUNIZATION DUE: ICD-10-CM

## 2025-08-13 DIAGNOSIS — H61.22 IMPACTED CERUMEN OF LEFT EAR: Primary | ICD-10-CM

## 2025-08-13 DIAGNOSIS — E55.9 VITAMIN D DEFICIENCY: ICD-10-CM

## 2025-08-13 DIAGNOSIS — H69.90 EUSTACHIAN TUBE DYSFUNCTION, UNSPECIFIED LATERALITY: ICD-10-CM

## 2025-08-13 DIAGNOSIS — Z13.1 SCREENING FOR DIABETES MELLITUS: ICD-10-CM

## 2025-08-13 LAB
ALBUMIN SERPL BCG-MCNC: 4.2 G/DL (ref 3.5–5.2)
ALP SERPL-CCNC: 77 U/L (ref 40–150)
ALT SERPL W P-5'-P-CCNC: 22 U/L (ref 0–50)
ANION GAP SERPL CALCULATED.3IONS-SCNC: 12 MMOL/L (ref 7–15)
AST SERPL W P-5'-P-CCNC: 35 U/L (ref 0–45)
BILIRUB SERPL-MCNC: 0.3 MG/DL
BUN SERPL-MCNC: 13.6 MG/DL (ref 6–20)
CALCIUM SERPL-MCNC: 9.6 MG/DL (ref 8.8–10.4)
CHLORIDE SERPL-SCNC: 104 MMOL/L (ref 98–107)
CHOLEST SERPL-MCNC: 184 MG/DL
CREAT SERPL-MCNC: 0.53 MG/DL (ref 0.51–0.95)
EGFRCR SERPLBLD CKD-EPI 2021: >90 ML/MIN/1.73M2
EST. AVERAGE GLUCOSE BLD GHB EST-MCNC: 117 MG/DL
FASTING STATUS PATIENT QL REPORTED: ABNORMAL
FASTING STATUS PATIENT QL REPORTED: ABNORMAL
GLUCOSE SERPL-MCNC: 65 MG/DL (ref 70–99)
HBA1C MFR BLD: 5.7 % (ref 0–5.6)
HCO3 SERPL-SCNC: 25 MMOL/L (ref 22–29)
HDLC SERPL-MCNC: 66 MG/DL
LDLC SERPL CALC-MCNC: 110 MG/DL
NONHDLC SERPL-MCNC: 118 MG/DL
POTASSIUM SERPL-SCNC: 4 MMOL/L (ref 3.4–5.3)
PROT SERPL-MCNC: 7.6 G/DL (ref 6.4–8.3)
SODIUM SERPL-SCNC: 141 MMOL/L (ref 135–145)
TRIGL SERPL-MCNC: 41 MG/DL
VIT D+METAB SERPL-MCNC: 49 NG/ML (ref 20–50)

## 2025-08-13 RX ORDER — FLUTICASONE PROPIONATE 50 MCG
1 SPRAY, SUSPENSION (ML) NASAL DAILY
Qty: 9.9 ML | Refills: 0 | Status: SHIPPED | OUTPATIENT
Start: 2025-08-13 | End: 2025-08-27

## 2025-08-13 ASSESSMENT — PAIN SCALES - GENERAL: PAINLEVEL_OUTOF10: NO PAIN (0)
